# Patient Record
Sex: FEMALE | URBAN - METROPOLITAN AREA
[De-identification: names, ages, dates, MRNs, and addresses within clinical notes are randomized per-mention and may not be internally consistent; named-entity substitution may affect disease eponyms.]

---

## 2018-07-09 ENCOUNTER — HOSPITAL ENCOUNTER (OUTPATIENT)
Age: 23
Discharge: HOME | End: 2018-07-11
Payer: MEDICAID

## 2018-07-09 PROCEDURE — 87075 CULTR BACTERIA EXCEPT BLOOD: CPT

## 2018-07-09 PROCEDURE — 87070 CULTURE OTHR SPECIMN AEROBIC: CPT

## 2018-07-11 LAB — WOUND/ABSCESS: NORMAL

## 2018-07-14 LAB — ANAEROBIC CULTURE: NORMAL

## 2018-09-17 ENCOUNTER — HOSPITAL ENCOUNTER (OUTPATIENT)
Age: 23
Discharge: HOME | End: 2018-09-19
Payer: MEDICAID

## 2018-09-17 PROCEDURE — 87070 CULTURE OTHR SPECIMN AEROBIC: CPT

## 2018-09-17 PROCEDURE — 87075 CULTR BACTERIA EXCEPT BLOOD: CPT

## 2018-09-19 LAB
ANAEROBIC CULTURE: NORMAL
ORGANISM: ABNORMAL
WOUND/ABSCESS: ABNORMAL
WOUND/ABSCESS: ABNORMAL

## 2023-03-28 DIAGNOSIS — F32.A DEPRESSION, UNSPECIFIED: ICD-10-CM

## 2023-03-28 DIAGNOSIS — F41.9 ANXIETY DISORDER, UNSPECIFIED: ICD-10-CM

## 2023-03-28 PROBLEM — J45.909 ASTHMA (HHS-HCC): Status: ACTIVE | Noted: 2023-03-28

## 2023-03-28 RX ORDER — TRAZODONE HYDROCHLORIDE 50 MG/1
TABLET ORAL
Qty: 90 TABLET | Refills: 1 | Status: SHIPPED | OUTPATIENT
Start: 2023-03-28 | End: 2024-02-28

## 2023-03-28 RX ORDER — IBUPROFEN 800 MG/1
TABLET ORAL
COMMUNITY
Start: 2023-01-11 | End: 2023-08-01 | Stop reason: ALTCHOICE

## 2023-03-28 RX ORDER — TRAZODONE HYDROCHLORIDE 50 MG/1
50 TABLET ORAL NIGHTLY PRN
COMMUNITY

## 2023-03-28 RX ORDER — BUSPIRONE HYDROCHLORIDE 15 MG/1
TABLET ORAL
COMMUNITY
Start: 2022-05-17

## 2023-03-28 RX ORDER — HYDROXYZINE HYDROCHLORIDE 25 MG/1
TABLET, FILM COATED ORAL
COMMUNITY
End: 2023-08-01 | Stop reason: SDUPTHER

## 2023-03-28 RX ORDER — ESCITALOPRAM OXALATE 20 MG/1
20 TABLET ORAL DAILY
COMMUNITY
End: 2024-02-28

## 2023-06-05 DIAGNOSIS — J45.909 UNSPECIFIED ASTHMA, UNCOMPLICATED (HHS-HCC): ICD-10-CM

## 2023-06-05 RX ORDER — ALBUTEROL SULFATE 90 UG/1
2 AEROSOL, METERED RESPIRATORY (INHALATION) EVERY 6 HOURS PRN
COMMUNITY
Start: 2023-05-11

## 2023-06-05 RX ORDER — ALBUTEROL SULFATE 90 UG/1
AEROSOL, METERED RESPIRATORY (INHALATION)
Qty: 18 G | Refills: 2 | Status: SHIPPED | OUTPATIENT
Start: 2023-06-05

## 2023-07-07 ENCOUNTER — APPOINTMENT (OUTPATIENT)
Dept: PRIMARY CARE | Facility: CLINIC | Age: 28
End: 2023-07-07
Payer: COMMERCIAL

## 2023-07-31 NOTE — PROGRESS NOTES
Subjective   Patient ID: Candelaria Robins is a 28 y.o. female who presents for Annual Exam (Yearly visit; panic attacks; discuss medications; referral for pysic).    HPI     Joyce Reyes in [facility]. She was living in [place] and she moved here to [place]. .      Depression/Anxiety: She has not been taking her medications, she would like to take them regularly. Sometimes they were not helping like they should have. She is taking her trazodone since the last couple of days. She had stopped taking her meds for about 2 months.   She has anxiety attacks and states it might be making her asthma worse. She is on Escitalopram, hydroxyzine, trazodone and buspirone. Her panic attacks last a while. She gets nauseated, cramping, difficulty breathing. She will go outside. She takes ginger for her.   She sees Community counseling. She has a history of cutting. She is trying to see them weekly.   She had seen Tika with [facility], does not think she was seeing [facility].   She does see community counseling, thinks she might have missed them.   She would like to see psychiatrist. Her panic attacks are happening 2-3 times minimum to 3-4 max daily. She states that everything hurts. Some days they get better and go away.     Chest pains: She is also having chest pains. They are a little different than when she has a panic attack, it would hurt to breathe. She has not felt this recently, did not feel the same.     She is drinking about 1 energy drink per day, she has an occasional dunking doughnuts. She drinks maybe once per week. She does drink pop regular.     Asthma: She uses a Ventolin inhaler as needed.      All other systems reviewed and negative for complaint unless stated above.     PMH: Depression and anxiety, asthma   Surgery: Laser eye surgery as a child.   Family: Father had cancer,  . Smoking (lung).   smoker: none   Etoh: drinks on occasion   Drug use: none      PAP:   "13th with OBGYN, in [place]       Review of Systems   Constitutional:  Negative for chills and fever.   HENT:  Negative for congestion, ear pain and rhinorrhea.    Eyes:  Negative for discharge and redness.   Respiratory:  Negative for cough, shortness of breath and wheezing.    Cardiovascular:  Negative for chest pain and leg swelling.   Gastrointestinal:  Negative for abdominal pain, constipation, diarrhea, nausea and vomiting.   Genitourinary:  Negative for difficulty urinating, frequency and urgency.   Musculoskeletal:  Negative for gait problem.   Skin:  Negative for rash and wound.   Neurological:  Negative for dizziness, weakness and headaches.   Psychiatric/Behavioral:  Negative for confusion. The patient is nervous/anxious.        Objective   /73 (BP Location: Right arm, Patient Position: Sitting, BP Cuff Size: Adult)   Pulse 70   Ht 1.6 m (5' 3\")   Wt 58.5 kg (129 lb)   SpO2 97%   BMI 22.85 kg/m²     Physical Exam  Vitals reviewed.   Constitutional:       Appearance: Normal appearance.   HENT:      Head: Normocephalic.      Right Ear: Tympanic membrane, ear canal and external ear normal.      Left Ear: Tympanic membrane, ear canal and external ear normal.      Nose: No rhinorrhea.      Mouth/Throat:      Mouth: Mucous membranes are moist.      Pharynx: Oropharynx is clear.   Eyes:      Pupils: Pupils are equal, round, and reactive to light.   Cardiovascular:      Rate and Rhythm: Normal rate and regular rhythm.      Pulses: Normal pulses.   Pulmonary:      Effort: Pulmonary effort is normal.      Breath sounds: Normal breath sounds.   Abdominal:      General: Abdomen is flat. Bowel sounds are normal.      Palpations: Abdomen is soft.   Musculoskeletal:         General: No tenderness. Normal range of motion.      Right lower leg: No edema.      Left lower leg: No edema.   Lymphadenopathy:      Cervical: No cervical adenopathy.   Skin:     General: Skin is warm and dry.      Findings: No rash. "   Neurological:      Mental Status: She is alert and oriented to person, place, and time.   Psychiatric:         Mood and Affect: Mood normal.         Behavior: Behavior normal.       Assessment/Plan       #Asthma   Continue to use inhaler as needed      #Anxiety and depression  Following with community counseling  Encouraged to ask about psych at community counseling  continue escitalopram, hydroxyzine, trazadone and buspirone   may discuss possible changing medications d/t anxiety attacks   possible effexor   Encouraged to continue meds   Cut back on caffeine   Follow up in 2 months     #Chest pain   If still having would consider stress test EKG   Zio patch possible     Likely related to anxiety

## 2023-08-01 ENCOUNTER — OFFICE VISIT (OUTPATIENT)
Dept: PRIMARY CARE | Facility: CLINIC | Age: 28
End: 2023-08-01
Payer: COMMERCIAL

## 2023-08-01 VITALS
BODY MASS INDEX: 22.86 KG/M2 | HEART RATE: 70 BPM | SYSTOLIC BLOOD PRESSURE: 108 MMHG | OXYGEN SATURATION: 97 % | DIASTOLIC BLOOD PRESSURE: 73 MMHG | HEIGHT: 63 IN | WEIGHT: 129 LBS

## 2023-08-01 DIAGNOSIS — F32.A ANXIETY AND DEPRESSION: ICD-10-CM

## 2023-08-01 DIAGNOSIS — F41.9 ANXIETY: Primary | ICD-10-CM

## 2023-08-01 DIAGNOSIS — J45.20 MILD INTERMITTENT ASTHMA, UNSPECIFIED WHETHER COMPLICATED (HHS-HCC): ICD-10-CM

## 2023-08-01 DIAGNOSIS — F41.9 ANXIETY AND DEPRESSION: ICD-10-CM

## 2023-08-01 DIAGNOSIS — F41.0 PANIC ATTACKS: ICD-10-CM

## 2023-08-01 PROCEDURE — 99213 OFFICE O/P EST LOW 20 MIN: CPT | Performed by: REGISTERED NURSE

## 2023-08-01 PROCEDURE — 1036F TOBACCO NON-USER: CPT | Performed by: REGISTERED NURSE

## 2023-08-01 RX ORDER — HYDROXYZINE HYDROCHLORIDE 25 MG/1
TABLET, FILM COATED ORAL
Qty: 90 TABLET | Refills: 2 | Status: SHIPPED | OUTPATIENT
Start: 2023-08-01

## 2023-08-01 ASSESSMENT — ENCOUNTER SYMPTOMS
DIARRHEA: 0
EYE REDNESS: 0
NERVOUS/ANXIOUS: 1
WOUND: 0
FEVER: 0
ABDOMINAL PAIN: 0
SHORTNESS OF BREATH: 0
VOMITING: 0
RHINORRHEA: 0
CONSTIPATION: 0
WEAKNESS: 0
NAUSEA: 0
COUGH: 0
HEADACHES: 0
WHEEZING: 0
CHILLS: 0
EYE DISCHARGE: 0
CONFUSION: 0
DIFFICULTY URINATING: 0
FREQUENCY: 0
DIZZINESS: 0

## 2023-09-13 ENCOUNTER — TELEPHONE (OUTPATIENT)
Dept: PRIMARY CARE | Facility: CLINIC | Age: 28
End: 2023-09-13
Payer: COMMERCIAL

## 2023-09-13 NOTE — TELEPHONE ENCOUNTER
Patient called to say she's been to the ER a few times for different sick symptoms. She has ears flushed in ER but is still having a plugged feeling and decreased hearing, sinus congestion, cough, runny nose. She was told at ER to take allergy medicine and debrox. Her sister told her that she knows a lot of people with these symptoms right now and suggested she call us. She asked if she should take an allergy medicine. I explained how it could be helpful and that the sinus pressure and congestion could cause her ears to feel full. I suggested she try an OTC cough medicine with an expectorant and increase her water intake to help loosen up mucus. Advised her to call back if she is not feeling better or has gotten worse in the next 1-2 weeks. She does not believe she needs debrox because they were able to remove wax from her ears at ER.

## 2023-09-27 PROBLEM — H10.30 CONJUNCTIVITIS, ACUTE: Status: ACTIVE | Noted: 2023-09-27

## 2023-09-27 PROBLEM — H61.20 CERUMEN IMPACTION: Status: ACTIVE | Noted: 2023-09-27

## 2023-09-27 PROBLEM — J30.2 SEASONAL ALLERGIES: Status: ACTIVE | Noted: 2023-09-27

## 2023-09-27 RX ORDER — CIPROFLOXACIN HYDROCHLORIDE 3 MG/ML
SOLUTION/ DROPS OPHTHALMIC
COMMUNITY

## 2023-09-27 NOTE — PROGRESS NOTES
"Subjective   Patient ID: Candelaria Robins is a 28 y.o. female who presents for Follow-up (Panic/anxiety attacks; chest pains; was once told she could benefit from a service dog and would like to discuss this;  ).    ANDREINA Reyes in [facility] care. She was living in [place] and she moved here to [place]. April 9th. 2022.     She wants a service dog. She is still in counseling. She wants one for her anxiety/depression. She cannot have a \"regular\" dog at her property. She moved out of her sisters house. She is still having heavy chest, SOB at times. Happens every morning. Trying to stay consistent with her medications.     Depression/Anxiety: She has not been taking her medications, she would like to take them regularly. Sometimes they were not helping like they should have. She is taking her trazodone since the last couple of days. She had stopped taking her meds for about 2 months.   She has anxiety attacks and states it might be making her asthma worse. She is on Escitalopram, hydroxyzine, trazodone and buspirone. Her panic attacks last a while. She gets nauseated, cramping, difficulty breathing. She will go outside. She takes serene for her.   She sees Community counseling. She has a history of cutting. She is trying to see them weekly.   She had seen Tika with [facility], does not think she was seeing [facility].   She does see community counseling, thinks she might have missed them.   She would like to see psychiatrist. Her panic attacks are happening 2-3 times minimum to 3-4 max daily. She states that everything hurts. Some days they get better and go away.      Chest pains: She still has multiple panic attacks at times. She is having heaviness 7/10 and hard to breath.     She is also having chest pains. They are a little different than when she has a panic attack, it would hurt to breathe. She has not felt this recently, did not feel the same.      She is drinking about 1 energy drink " "per day, she has an occasional dunking doughnuts. She drinks maybe once per week. She does drink pop regular.      Asthma: She uses a Ventolin inhaler as needed.      All other systems reviewed and negative for complaint unless stated above.     PMH: Depression and anxiety, asthma   Surgery: Laser eye surgery as a child.   Family: Father had cancer,  . Smoking (lung).   smoker: none   Etoh: drinks on occasion   Drug use: none      PAP:  with OBGYN, in [place]       Review of Systems   Constitutional:  Negative for chills and fever.   HENT:  Negative for congestion, ear pain and rhinorrhea.    Eyes:  Negative for discharge and redness.   Respiratory:  Negative for cough, shortness of breath and wheezing.    Cardiovascular:  Negative for chest pain and leg swelling.   Gastrointestinal:  Negative for abdominal pain, constipation, diarrhea, nausea and vomiting.   Genitourinary:  Negative for difficulty urinating, frequency and urgency.   Musculoskeletal:  Negative for gait problem.   Skin:  Negative for rash and wound.   Neurological:  Negative for dizziness, weakness and headaches.   Psychiatric/Behavioral:  Negative for confusion. The patient is nervous/anxious.        Objective   /88 (BP Location: Right arm, Patient Position: Sitting, BP Cuff Size: Adult)   Pulse 76   Ht 1.6 m (5' 3\")   Wt 57.4 kg (126 lb 9.6 oz)   SpO2 100%   BMI 22.43 kg/m²     Physical Exam  Vitals reviewed.   Constitutional:       Appearance: Normal appearance.   HENT:      Head: Normocephalic.      Right Ear: Tympanic membrane, ear canal and external ear normal.      Left Ear: Tympanic membrane, ear canal and external ear normal.      Nose: No rhinorrhea.      Mouth/Throat:      Mouth: Mucous membranes are moist.      Pharynx: Oropharynx is clear.   Eyes:      Pupils: Pupils are equal, round, and reactive to light.   Cardiovascular:      Rate and Rhythm: Normal rate and regular rhythm.      Pulses: Normal pulses. "   Pulmonary:      Effort: Pulmonary effort is normal.      Breath sounds: Normal breath sounds.   Abdominal:      General: Abdomen is flat. Bowel sounds are normal.      Palpations: Abdomen is soft.   Musculoskeletal:         General: No tenderness. Normal range of motion.      Right lower leg: No edema.      Left lower leg: No edema.   Lymphadenopathy:      Cervical: No cervical adenopathy.   Skin:     General: Skin is warm and dry.      Findings: No rash.   Neurological:      Mental Status: She is alert and oriented to person, place, and time.   Psychiatric:         Mood and Affect: Mood normal.         Behavior: Behavior normal.         Assessment/Plan         #Asthma   Continue to use inhaler as needed      #Anxiety and depression  Following with community counseling  Has an appointment with psych 10/24  Letter provided stating she has anxiety and would benefit from a  dog   continue escitalopram, hydroxyzine, trazadone and buspirone   may discuss possible changing medications d/t anxiety attacks   possible effexor   Encouraged to continue meds   Cut back on caffeine        #Chest pain   If still having would consider stress test EKG   Zio patch ordered      Likely related to anxiety

## 2023-10-02 ENCOUNTER — OFFICE VISIT (OUTPATIENT)
Dept: PRIMARY CARE | Facility: CLINIC | Age: 28
End: 2023-10-02
Payer: COMMERCIAL

## 2023-10-02 VITALS
SYSTOLIC BLOOD PRESSURE: 138 MMHG | DIASTOLIC BLOOD PRESSURE: 88 MMHG | HEIGHT: 63 IN | OXYGEN SATURATION: 100 % | HEART RATE: 76 BPM | BODY MASS INDEX: 22.43 KG/M2 | WEIGHT: 126.6 LBS

## 2023-10-02 DIAGNOSIS — R07.9 CHEST PAIN, UNSPECIFIED TYPE: Primary | ICD-10-CM

## 2023-10-02 PROCEDURE — 1036F TOBACCO NON-USER: CPT | Performed by: REGISTERED NURSE

## 2023-10-02 PROCEDURE — 99214 OFFICE O/P EST MOD 30 MIN: CPT | Performed by: REGISTERED NURSE

## 2023-10-02 NOTE — PATIENT INSTRUCTIONS
Radiology:  - Radiology: [phone]  -ECHO, [place]: [phone]    [facility] [phone]

## 2023-10-02 NOTE — LETTER
To Whom It May Concern,       Candelaria Robins is a patient under my care. She is currently being treated for Anxiety and depression. It is my medical opinion that she would benefit from the use of an emotional support dog which she is looking into. She is planning to register this pet as an emotional support  and she is willing to provide proof if needed in order to keep her emotional support  in her home/apartment.   If you have any questions or concerns, please feel free to call me at my office [phone].         Thank you,             MAR Zavala

## 2023-10-04 ENCOUNTER — TELEPHONE (OUTPATIENT)
Dept: PRIMARY CARE | Facility: CLINIC | Age: 28
End: 2023-10-04
Payer: COMMERCIAL

## 2023-10-04 ENCOUNTER — CLINICAL SUPPORT (OUTPATIENT)
Dept: PRIMARY CARE | Facility: CLINIC | Age: 28
End: 2023-10-04
Payer: COMMERCIAL

## 2023-10-04 DIAGNOSIS — J02.9 SORE THROAT: ICD-10-CM

## 2023-10-04 LAB — POC RAPID STREP: NEGATIVE

## 2023-10-04 PROCEDURE — 87880 STREP A ASSAY W/OPTIC: CPT | Performed by: REGISTERED NURSE

## 2023-10-04 NOTE — TELEPHONE ENCOUNTER
Candelaria said her sister looked at her throat and thinks she has strep. She wants to know what she can do to treat it.

## 2023-10-05 ENCOUNTER — HOSPITAL ENCOUNTER (EMERGENCY)
Facility: HOSPITAL | Age: 28
Discharge: HOME | End: 2023-10-05
Attending: EMERGENCY MEDICINE | Admitting: EMERGENCY MEDICINE
Payer: COMMERCIAL

## 2023-10-05 VITALS
HEIGHT: 63 IN | RESPIRATION RATE: 18 BRPM | TEMPERATURE: 98.4 F | HEART RATE: 76 BPM | BODY MASS INDEX: 22.32 KG/M2 | OXYGEN SATURATION: 99 % | DIASTOLIC BLOOD PRESSURE: 71 MMHG | WEIGHT: 126 LBS | SYSTOLIC BLOOD PRESSURE: 108 MMHG

## 2023-10-05 DIAGNOSIS — B08.4 HAND, FOOT AND MOUTH DISEASE: Primary | ICD-10-CM

## 2023-10-05 PROCEDURE — 99281 EMR DPT VST MAYX REQ PHY/QHP: CPT | Performed by: EMERGENCY MEDICINE

## 2023-10-05 PROCEDURE — 99282 EMERGENCY DEPT VISIT SF MDM: CPT

## 2023-10-05 ASSESSMENT — PAIN DESCRIPTION - PAIN TYPE: TYPE: ACUTE PAIN

## 2023-10-05 ASSESSMENT — COLUMBIA-SUICIDE SEVERITY RATING SCALE - C-SSRS
1. IN THE PAST MONTH, HAVE YOU WISHED YOU WERE DEAD OR WISHED YOU COULD GO TO SLEEP AND NOT WAKE UP?: NO
6. HAVE YOU EVER DONE ANYTHING, STARTED TO DO ANYTHING, OR PREPARED TO DO ANYTHING TO END YOUR LIFE?: NO
2. HAVE YOU ACTUALLY HAD ANY THOUGHTS OF KILLING YOURSELF?: NO

## 2023-10-05 ASSESSMENT — PAIN SCALES - GENERAL: PAINLEVEL_OUTOF10: 2

## 2023-10-05 ASSESSMENT — PAIN - FUNCTIONAL ASSESSMENT: PAIN_FUNCTIONAL_ASSESSMENT: 0-10

## 2023-10-05 NOTE — ED PROVIDER NOTES
Department of Emergency Medicine   ED  Provider Note  Admit Date/RoomTime: 10/5/2023  5:40 PM  ED Room: [facility]/[address]              History of Present Illness:   Candelaria Robins is a 28 y.o. female presenting to the ED for rash on her hands and feet, beginning 2 days after caring for her nieces and nephews who had an foot mouth disease.  The complaint has been persistent, moderate in severity, and worsened by nothing.  Patient denies any cough shortness of breath headache stiff neck fever chills or other signs systemic illness.  She has a mildly uncomfortable rash to her hands feet and on the roof of her mouth.      Review of Systems:   Pertinent positives and review of systems as noted above.  Remaining 10 review of systems is negative or noncontributory to today's episode of care.        --------------------------------------------- PAST HISTORY ---------------------------------------------  Past Medical History:  has no past medical history on file.    Past Surgical History:  has no past surgical history on file.    Social History:  reports that she has never smoked. She has never been exposed to tobacco smoke. She has never used smokeless tobacco. She reports that she does not currently use alcohol. She reports that she does not use drugs.    Family History: family history is not on file. Unless otherwise noted, family history is non contributory    The patient’s home medications have been reviewed.    Allergies: Patient has no known allergies.    -------------------------------------------------- RESULTS -------------------------------------------------  All laboratory and radiology results have been personally reviewed by myself   LABS:  Labs Reviewed - No data to display      RADIOLOGY:  Interpreted by Radiologist.  No orders to display       No results found for this or any previous visit (from the past 4464 hour(s)).  ------------------------- NURSING NOTES AND VITALS REVIEWED  "---------------------------   The nursing notes within the ED encounter and vital signs as below have been reviewed.   /71   Pulse 76   Temp 36.9 °C (98.4 °F) (Temporal)   Resp 18   Ht 1.6 m (5' 3\")   Wt 57.2 kg (126 lb)   SpO2 99%   BMI 22.32 kg/m²   Oxygen Saturation Interpretation: Normal      ---------------------------------------------------PHYSICAL EXAM--------------------------------------    Constitutional/General: Alert and oriented x3, well appearing, non toxic in NAD  Head: Normocephalic and atraumatic  Eyes: PERRL, EOMI, conjunctiva normal, sclera non icteric  Mouth: Oropharynx clear, handling secretions, no trismus, no asymmetry of the posterior oropharynx or uvular edema  Neck: Supple, full ROM, non tender to palpation in the midline, no stridor, no crepitus, no meningeal signs  Respiratory: Lungs clear to auscultation bilaterally, no wheezes, rales, or rhonchi. Not in respiratory distress  Cardiovascular:  Regular rate. Regular rhythm. No murmurs, gallops, or rubs. 2+ distal pulses  Chest: No chest wall tenderness  GI:  Abdomen Soft, Non tender, Non distended.  +BS. No organomegaly, no palpable masses,  No rebound, guarding, or rigidity.   Musculoskeletal: Moves all extremities x 4. Warm and well perfused, no clubbing, cyanosis, or edema. Capillary refill <3 seconds  Integument: skin warm and dry.  Has a classic coxsackievirus rash to the forefoot mouth hands and feet.  She has known exposure.  Lymphatic: no lymphadenopathy noted  Neurologic: GCS 15, no focal deficits, symmetric strength 5/5 in the upper and lower extremities bilaterally  Psychiatric: Normal Affect    Procedures    ------------------------------ ED COURSE/MEDICAL DECISION MAKING----------------------    Medical Decision Making:   Patient is stable for outpatient management.  She is advised to take Tylenol Advil for aches and pains rest encourage fluids and to quarantine at home for 3 to 5 days for the rash starts to " improve.  She is return for worsening symptoms or concerns.    Diagnoses as of 10/05/23 1814   Hand, foot and mouth disease      Counseling:   The emergency provider has spoken with the patient and discussed today’s results, in addition to providing specific details for the plan of care and counseling regarding the diagnosis and prognosis.  Questions are answered at this time and they are agreeable with the plan.      --------------------------------- IMPRESSION AND DISPOSITION ---------------------------------        IMPRESSION  1. Hand, foot and mouth disease        DISPOSITION  Disposition: Discharge to home  Patient condition is good      Billing Provider Critical Care Time: 0 minutes     Dutch Alejandra MD  10/05/23 1815

## 2023-10-05 NOTE — DISCHARGE INSTRUCTIONS
Tylenol or Advil for aches and pains.    Rest encourage fluids.    Quarantine at home is much as possible for the next 3 days.    The rash expect the last 5 to 7 days.    Return for worsening symptoms or concerns

## 2023-10-05 NOTE — ED TRIAGE NOTES
Patient ambulatory to ED, A&Ox3 for rash to hands & feet that is painful. No pruritus. Exposure to hand foot and mouth from niece/nephews.

## 2023-10-08 ASSESSMENT — ENCOUNTER SYMPTOMS
CONFUSION: 0
ABDOMINAL PAIN: 0
VOMITING: 0
DIARRHEA: 0
CONSTIPATION: 0
FEVER: 0
DIZZINESS: 0
WOUND: 0
EYE REDNESS: 0
FREQUENCY: 0
COUGH: 0
WEAKNESS: 0
RHINORRHEA: 0
NERVOUS/ANXIOUS: 1
HEADACHES: 0
WHEEZING: 0
SHORTNESS OF BREATH: 0
EYE DISCHARGE: 0
NAUSEA: 0
CHILLS: 0
DIFFICULTY URINATING: 0

## 2023-12-30 ENCOUNTER — HOSPITAL ENCOUNTER (EMERGENCY)
Facility: HOSPITAL | Age: 28
Discharge: HOME | End: 2023-12-30
Attending: EMERGENCY MEDICINE
Payer: COMMERCIAL

## 2023-12-30 ENCOUNTER — APPOINTMENT (OUTPATIENT)
Dept: RADIOLOGY | Facility: HOSPITAL | Age: 28
End: 2023-12-30
Payer: COMMERCIAL

## 2023-12-30 VITALS
HEART RATE: 93 BPM | RESPIRATION RATE: 18 BRPM | TEMPERATURE: 98.2 F | DIASTOLIC BLOOD PRESSURE: 82 MMHG | OXYGEN SATURATION: 96 % | WEIGHT: 125 LBS | HEIGHT: 63 IN | SYSTOLIC BLOOD PRESSURE: 121 MMHG | BODY MASS INDEX: 22.15 KG/M2

## 2023-12-30 DIAGNOSIS — M79.641 PAIN OF RIGHT HAND: Primary | ICD-10-CM

## 2023-12-30 PROCEDURE — 99283 EMERGENCY DEPT VISIT LOW MDM: CPT | Mod: 25 | Performed by: EMERGENCY MEDICINE

## 2023-12-30 PROCEDURE — 73130 X-RAY EXAM OF HAND: CPT | Mod: RT

## 2023-12-30 PROCEDURE — 73130 X-RAY EXAM OF HAND: CPT | Mod: RIGHT SIDE | Performed by: RADIOLOGY

## 2023-12-30 ASSESSMENT — COLUMBIA-SUICIDE SEVERITY RATING SCALE - C-SSRS
6. HAVE YOU EVER DONE ANYTHING, STARTED TO DO ANYTHING, OR PREPARED TO DO ANYTHING TO END YOUR LIFE?: NO
1. IN THE PAST MONTH, HAVE YOU WISHED YOU WERE DEAD OR WISHED YOU COULD GO TO SLEEP AND NOT WAKE UP?: NO
2. HAVE YOU ACTUALLY HAD ANY THOUGHTS OF KILLING YOURSELF?: NO

## 2023-12-30 ASSESSMENT — PAIN - FUNCTIONAL ASSESSMENT: PAIN_FUNCTIONAL_ASSESSMENT: 0-10

## 2023-12-30 ASSESSMENT — PAIN DESCRIPTION - ORIENTATION: ORIENTATION: RIGHT

## 2023-12-30 ASSESSMENT — PAIN DESCRIPTION - LOCATION: LOCATION: HAND

## 2023-12-30 ASSESSMENT — PAIN DESCRIPTION - PAIN TYPE: TYPE: ACUTE PAIN

## 2023-12-30 ASSESSMENT — PAIN SCALES - GENERAL: PAINLEVEL_OUTOF10: 7

## 2023-12-30 NOTE — ED PROVIDER NOTES
HPI   Chief Complaint   Patient presents with   • Cyst     Right hand       Patient presents to the emergency department with a chief complaint of slight swelling and pain to her right third knuckle area.  Patient denies any trauma.  Patient is in absolutely no acute distress on arrival.    ROS:    CONSTITUTIONAL: Denies weight loss, fever and chills    HEENT: Denies changes in vision and hearing, No sore throat    RESPIRATORY: Denies SOB and cough    CV: Denies palpitations or chest pain    GI: Denies abdominal pain, nausea, vomiting and diarrhea    : Denies dysuria and urinary frequency    MUSCULOSKELETAL: Right third knuckle pain and swelling    SKIN: Denies rash and pruritus    NEUROLOGICAL: Denies headache and syncope    PSYCHIATRIC: Denies recent changes in mood. Denies anxiety and depression      PHYSICAL EXAM:    GENERAL: Alert and oriented x 3. No acute distress. Well-nourished    EYES: EOMI. Anicteric    HEENT: Moist mucous membranes. No scleral icterus. No cervical lymphadenopathy    LUNGS: Clear to auscultation bilaterally. No accessory muscle use    CARDIOVASCULAR: Regular rate and rhythm. No murmur. No JVD    ABDOMEN: Soft, non-tender and non-distended. No palpable masses    EXTREMITIES: Dorsum of right hand with slight third knuckle swelling with slight tenderness palpation, no ecchymosis noted    SKIN: No rashes or lesions    NEUROLOGIC: No focal neurological deficits. CN II-XII grossly intact    PSYCHIATRIC: Cooperative. Appropriate mood and affect      Medical Decision Making:    Differential Diagnosis: Contusion, fracture, cyst    Clinical Laboratory Review:    Imaging Review: Reviewed imaging which was completely within normal limits the guys did    Discussion with Consulting Physician:    Treatment Plan: Discussed negative x-ray with patient at this time    Follow Up:  Follow up with your primary care physician as soon as possible    Return Precautions:  Return to the emergency department if  symptoms worsen at any time                          No data recorded                Patient History   History reviewed. No pertinent past medical history.  History reviewed. No pertinent surgical history.  No family history on file.  Social History     Tobacco Use   • Smoking status: Never     Passive exposure: Never   • Smokeless tobacco: Never   Substance Use Topics   • Alcohol use: Not Currently   • Drug use: Never       Physical Exam   ED Triage Vitals [12/30/23 1146]   Temp Heart Rate Resp BP   36.8 °C (98.2 °F) 93 18 121/82      SpO2 Temp Source Heart Rate Source Patient Position   96 % Temporal Monitor --      BP Location FiO2 (%)     -- --       Physical Exam    ED Course & MDM   Diagnoses as of 12/30/23 1259   Pain of right hand       Medical Decision Making      Procedure  Procedures    Diagnoses as of 12/30/23 1259   Pain of right hand          Isa Sullivan DO  12/30/23 1259

## 2023-12-30 NOTE — ED TRIAGE NOTES
Pt to ED c/o possible cyst in right hand. Pt states she has had cyst in the same hand before and feels she may have another. Pt has swollen area around 2nd and 3rd finger/knuckle. Pt states it is painful, 7/10. States she noticed it yesterday.

## 2024-01-02 ENCOUNTER — APPOINTMENT (OUTPATIENT)
Dept: PRIMARY CARE | Facility: CLINIC | Age: 29
End: 2024-01-02
Payer: COMMERCIAL

## 2024-01-08 ENCOUNTER — HOSPITAL ENCOUNTER (EMERGENCY)
Facility: HOSPITAL | Age: 29
Discharge: HOME | End: 2024-01-08
Attending: EMERGENCY MEDICINE
Payer: COMMERCIAL

## 2024-01-08 VITALS
OXYGEN SATURATION: 99 % | DIASTOLIC BLOOD PRESSURE: 70 MMHG | TEMPERATURE: 100.9 F | WEIGHT: 125 LBS | HEART RATE: 72 BPM | SYSTOLIC BLOOD PRESSURE: 116 MMHG | RESPIRATION RATE: 20 BRPM | BODY MASS INDEX: 22.15 KG/M2 | HEIGHT: 63 IN

## 2024-01-08 DIAGNOSIS — J45.909 MILD ASTHMA WITHOUT COMPLICATION, UNSPECIFIED WHETHER PERSISTENT (HHS-HCC): ICD-10-CM

## 2024-01-08 DIAGNOSIS — J01.90 ACUTE SINUSITIS, RECURRENCE NOT SPECIFIED, UNSPECIFIED LOCATION: Primary | ICD-10-CM

## 2024-01-08 LAB
FLUAV RNA RESP QL NAA+PROBE: NOT DETECTED
FLUBV RNA RESP QL NAA+PROBE: NOT DETECTED
RSV RNA RESP QL NAA+PROBE: NOT DETECTED
S PYO DNA THROAT QL NAA+PROBE: NOT DETECTED
SARS-COV-2 RNA RESP QL NAA+PROBE: NOT DETECTED

## 2024-01-08 PROCEDURE — 2500000004 HC RX 250 GENERAL PHARMACY W/ HCPCS (ALT 636 FOR OP/ED): Mod: SE | Performed by: EMERGENCY MEDICINE

## 2024-01-08 PROCEDURE — 2500000005 HC RX 250 GENERAL PHARMACY W/O HCPCS: Mod: SE

## 2024-01-08 PROCEDURE — 99283 EMERGENCY DEPT VISIT LOW MDM: CPT | Performed by: EMERGENCY MEDICINE

## 2024-01-08 PROCEDURE — 87637 SARSCOV2&INF A&B&RSV AMP PRB: CPT | Performed by: EMERGENCY MEDICINE

## 2024-01-08 PROCEDURE — 2500000001 HC RX 250 WO HCPCS SELF ADMINISTERED DRUGS (ALT 637 FOR MEDICARE OP): Mod: SE

## 2024-01-08 PROCEDURE — 87651 STREP A DNA AMP PROBE: CPT | Performed by: EMERGENCY MEDICINE

## 2024-01-08 RX ORDER — ALBUTEROL SULFATE 90 UG/1
1-2 AEROSOL, METERED RESPIRATORY (INHALATION) EVERY 6 HOURS PRN
Qty: 18 G | Refills: 0 | Status: SHIPPED | OUTPATIENT
Start: 2024-01-08 | End: 2024-06-07 | Stop reason: SDUPTHER

## 2024-01-08 RX ORDER — ONDANSETRON HYDROCHLORIDE 2 MG/ML
4 INJECTION, SOLUTION INTRAVENOUS ONCE
Status: DISCONTINUED | OUTPATIENT
Start: 2024-01-08 | End: 2024-01-08

## 2024-01-08 RX ORDER — AMOXICILLIN AND CLAVULANATE POTASSIUM 875; 125 MG/1; MG/1
TABLET, FILM COATED ORAL
Status: COMPLETED
Start: 2024-01-08 | End: 2024-01-08

## 2024-01-08 RX ORDER — PREDNISONE 10 MG/1
50 TABLET ORAL DAILY
Qty: 25 TABLET | Refills: 0 | Status: SHIPPED | OUTPATIENT
Start: 2024-01-08 | End: 2024-01-13

## 2024-01-08 RX ORDER — AMOXICILLIN AND CLAVULANATE POTASSIUM 875; 125 MG/1; MG/1
1 TABLET, FILM COATED ORAL ONCE
Status: COMPLETED | OUTPATIENT
Start: 2024-01-08 | End: 2024-01-08

## 2024-01-08 RX ORDER — ONDANSETRON 4 MG/1
TABLET, ORALLY DISINTEGRATING ORAL
Status: COMPLETED
Start: 2024-01-08 | End: 2024-01-08

## 2024-01-08 RX ORDER — PREDNISONE 20 MG/1
40 TABLET ORAL ONCE
Status: COMPLETED | OUTPATIENT
Start: 2024-01-08 | End: 2024-01-08

## 2024-01-08 RX ORDER — BENZONATATE 100 MG/1
100 CAPSULE ORAL 3 TIMES DAILY PRN
Qty: 20 CAPSULE | Refills: 0 | Status: SHIPPED | OUTPATIENT
Start: 2024-01-08 | End: 2024-01-15

## 2024-01-08 RX ORDER — ONDANSETRON 4 MG/1
4 TABLET, ORALLY DISINTEGRATING ORAL EVERY 8 HOURS PRN
Qty: 20 TABLET | Refills: 0 | Status: SHIPPED | OUTPATIENT
Start: 2024-01-08 | End: 2024-01-15

## 2024-01-08 RX ORDER — AMOXICILLIN AND CLAVULANATE POTASSIUM 875; 125 MG/1; MG/1
1 TABLET, FILM COATED ORAL EVERY 12 HOURS
Qty: 20 TABLET | Refills: 0 | Status: SHIPPED | OUTPATIENT
Start: 2024-01-08 | End: 2024-01-18

## 2024-01-08 RX ORDER — ONDANSETRON 4 MG/1
4 TABLET, ORALLY DISINTEGRATING ORAL ONCE
Status: COMPLETED | OUTPATIENT
Start: 2024-01-08 | End: 2024-01-08

## 2024-01-08 RX ADMIN — ONDANSETRON 4 MG: 4 TABLET, ORALLY DISINTEGRATING ORAL at 23:08

## 2024-01-08 RX ADMIN — AMOXICILLIN AND CLAVULANATE POTASSIUM 875 MG: 875; 125 TABLET, FILM COATED ORAL at 23:10

## 2024-01-08 RX ADMIN — PREDNISONE 40 MG: 20 TABLET ORAL at 23:10

## 2024-01-08 ASSESSMENT — PAIN - FUNCTIONAL ASSESSMENT: PAIN_FUNCTIONAL_ASSESSMENT: 0-10

## 2024-01-08 ASSESSMENT — PAIN DESCRIPTION - PAIN TYPE: TYPE: ACUTE PAIN

## 2024-01-08 ASSESSMENT — PAIN SCALES - GENERAL: PAINLEVEL_OUTOF10: 5 - MODERATE PAIN

## 2024-01-09 NOTE — ED PROVIDER NOTES
HPI   Chief Complaint   Patient presents with    Flu Symptoms       28-year-old female presents emergency department complaining of cough and congestion for the past few days.  She had an episode of emesis.  Denies any diarrhea.  No chest pain.  She has a history of asthma and has been wheezing some.  No fevers or chills.  Denies any other complaints.  Has not taken anything to help with the symptoms.      History provided by:  Patient                      No data recorded                Patient History   History reviewed. No pertinent past medical history.  History reviewed. No pertinent surgical history.  No family history on file.  Social History     Tobacco Use    Smoking status: Never     Passive exposure: Never    Smokeless tobacco: Never   Substance Use Topics    Alcohol use: Not Currently    Drug use: Never       Physical Exam   ED Triage Vitals [01/08/24 2135]   Temp Heart Rate Resp BP   38.3 °C (100.9 °F) (!) 120 18 112/81      SpO2 Temp Source Heart Rate Source Patient Position   98 % Skin -- Sitting      BP Location FiO2 (%)     Left arm --       Physical Exam  HENT:      Head: Normocephalic and atraumatic.      Mouth/Throat:      Mouth: Mucous membranes are moist.      Pharynx: Posterior oropharyngeal erythema present.      Comments: Yellowish postnasal drip  Eyes:      Extraocular Movements: Extraocular movements intact.      Conjunctiva/sclera: Conjunctivae normal.   Cardiovascular:      Rate and Rhythm: Normal rate and regular rhythm.   Pulmonary:      Effort: Pulmonary effort is normal.      Breath sounds: Wheezing present.   Abdominal:      Palpations: Abdomen is soft.      Tenderness: There is no abdominal tenderness.   Musculoskeletal:         General: Normal range of motion.   Skin:     General: Skin is warm and dry.   Neurological:      General: No focal deficit present.      Mental Status: She is alert and oriented to person, place, and time.   Psychiatric:         Behavior: Behavior normal.          ED Course & MDM   Diagnoses as of 01/08/24 3884   Acute sinusitis, recurrence not specified, unspecified location   Mild asthma without complication, unspecified whether persistent   ED course: Patient with upper respiratory tract infection, sinusitis.  Negative COVID, flu and rapid strep.  Started on Augmentin for acute sinusitis.  Dose of prednisone.  Patient will be discharged to continue taking medications, she has mild bronchospasm and the prednisone should help with that.  She is given a prescription for an albuterol inhaler in addition to antibiotics and steroids.  Tessalon Perles for the cough.  Patient discharged in improved condition.    Medical Decision Making  Patient presents with cough and congestion and sore throat.  Differential includes sinusitis, COVID, flu, RSV, pneumonia, bronchitis.  Patient has significant congestion and facial pressure is a history of sinusitis.  She has postnasal drip.  She will be covered with antibiotics.  Clinically not pneumonia.  She will be placed on steroids, cough medication and will be discharged with a prescription for antiemetics.  Patient is not dehydrated that will require IV hydration, she has been drinking in the emergency department.  Patient discharged in improved condition.    Amount and/or Complexity of Data Reviewed  Labs:  Decision-making details documented in ED Course.        Procedure  Procedures     Delmer Dawn MD  01/08/24 1084

## 2024-02-28 DIAGNOSIS — F32.A DEPRESSION, UNSPECIFIED: ICD-10-CM

## 2024-02-28 DIAGNOSIS — F41.9 ANXIETY DISORDER, UNSPECIFIED: ICD-10-CM

## 2024-02-28 RX ORDER — ESCITALOPRAM OXALATE 20 MG/1
20 TABLET ORAL DAILY
Qty: 90 TABLET | Refills: 1 | Status: SHIPPED | OUTPATIENT
Start: 2024-02-28

## 2024-02-28 RX ORDER — TRAZODONE HYDROCHLORIDE 50 MG/1
50 TABLET ORAL NIGHTLY PRN
Qty: 90 TABLET | Refills: 1 | Status: SHIPPED | OUTPATIENT
Start: 2024-02-28

## 2024-06-07 DIAGNOSIS — J45.909 MILD ASTHMA WITHOUT COMPLICATION, UNSPECIFIED WHETHER PERSISTENT (HHS-HCC): ICD-10-CM

## 2024-06-07 RX ORDER — ALBUTEROL SULFATE 90 UG/1
1-2 AEROSOL, METERED RESPIRATORY (INHALATION) EVERY 6 HOURS PRN
Qty: 18 G | Refills: 0 | Status: SHIPPED | OUTPATIENT
Start: 2024-06-07 | End: 2024-07-07

## 2024-07-07 DIAGNOSIS — J45.909 MILD ASTHMA WITHOUT COMPLICATION, UNSPECIFIED WHETHER PERSISTENT (HHS-HCC): ICD-10-CM

## 2024-07-08 RX ORDER — ALBUTEROL SULFATE 90 UG/1
1-2 AEROSOL, METERED RESPIRATORY (INHALATION) EVERY 6 HOURS PRN
Qty: 18 G | Refills: 1 | Status: SHIPPED | OUTPATIENT
Start: 2024-07-08 | End: 2024-08-07

## 2024-08-29 ENCOUNTER — HOSPITAL ENCOUNTER (EMERGENCY)
Facility: HOSPITAL | Age: 29
Discharge: HOME | End: 2024-08-29
Attending: EMERGENCY MEDICINE
Payer: COMMERCIAL

## 2024-08-29 VITALS
OXYGEN SATURATION: 100 % | TEMPERATURE: 97.6 F | HEIGHT: 63 IN | RESPIRATION RATE: 15 BRPM | BODY MASS INDEX: 20.55 KG/M2 | HEART RATE: 98 BPM | WEIGHT: 116 LBS | DIASTOLIC BLOOD PRESSURE: 71 MMHG | SYSTOLIC BLOOD PRESSURE: 130 MMHG

## 2024-08-29 DIAGNOSIS — U07.1 COVID-19: Primary | ICD-10-CM

## 2024-08-29 LAB
FLUAV RNA RESP QL NAA+PROBE: NOT DETECTED
FLUBV RNA RESP QL NAA+PROBE: NOT DETECTED
SARS-COV-2 RNA RESP QL NAA+PROBE: DETECTED

## 2024-08-29 PROCEDURE — 2500000001 HC RX 250 WO HCPCS SELF ADMINISTERED DRUGS (ALT 637 FOR MEDICARE OP): Mod: SE | Performed by: EMERGENCY MEDICINE

## 2024-08-29 PROCEDURE — 99283 EMERGENCY DEPT VISIT LOW MDM: CPT

## 2024-08-29 PROCEDURE — 87502 INFLUENZA DNA AMP PROBE: CPT | Performed by: EMERGENCY MEDICINE

## 2024-08-29 RX ORDER — IBUPROFEN 600 MG/1
600 TABLET ORAL EVERY 8 HOURS PRN
Qty: 30 TABLET | Refills: 0 | Status: SHIPPED | OUTPATIENT
Start: 2024-08-29

## 2024-08-29 RX ORDER — IBUPROFEN 600 MG/1
600 TABLET ORAL ONCE
Status: COMPLETED | OUTPATIENT
Start: 2024-08-29 | End: 2024-08-29

## 2024-08-29 RX ADMIN — IBUPROFEN 600 MG: 600 TABLET ORAL at 13:05

## 2024-08-29 ASSESSMENT — ENCOUNTER SYMPTOMS
COLOR CHANGE: 0
EYE PAIN: 0
RHINORRHEA: 1
FEVER: 1
SHORTNESS OF BREATH: 0
ABDOMINAL PAIN: 0
COUGH: 1
ARTHRALGIAS: 1
CHILLS: 1
MYALGIAS: 1
DYSURIA: 0
VOMITING: 0
PALPITATIONS: 0
SEIZURES: 0
NAUSEA: 1
BACK PAIN: 0
HEMATURIA: 0
SORE THROAT: 0

## 2024-08-29 ASSESSMENT — PAIN - FUNCTIONAL ASSESSMENT: PAIN_FUNCTIONAL_ASSESSMENT: 0-10

## 2024-08-29 ASSESSMENT — PAIN DESCRIPTION - LOCATION
LOCATION: GENERALIZED
LOCATION: GENERALIZED

## 2024-08-29 ASSESSMENT — PAIN SCALES - GENERAL
PAINLEVEL_OUTOF10: 9

## 2024-08-29 ASSESSMENT — PAIN DESCRIPTION - PROGRESSION: CLINICAL_PROGRESSION: NOT CHANGED

## 2024-08-29 ASSESSMENT — PAIN DESCRIPTION - DESCRIPTORS: DESCRIPTORS: ACHING

## 2024-08-29 NOTE — DISCHARGE INSTRUCTIONS
Push clear fluids. REST.  You may take tylenol and/or ibuprofen as needed for pain/fever.  See Covid information sheet.  Self quarantine for 5 days from the Start of your symptoms

## 2024-08-29 NOTE — ED PROVIDER NOTES
Department of Emergency Medicine   ED  Provider Note  Admit Date/RoomTime: 8/29/2024 12:24 PM  ED Room: 10/10                  History of Present Illness:   Candelaria Robins is a 29 y.o. female presenting to the ED for cold/flu symptoms, beginning 2 days ago.  The complaint has been persistent, moderate in severity, and worsened by nothing.  Patient complains of whole body aches joint aches.  She has had a little bit of a mild headache.  Runny nose.  Nausea and vomiting x 1.  No chest pain or shortness of breath.  Positive exposure to family member who was recently diagnosed with COVID.  She states she took 2 COVID test yesterday at home, and they were negative.      Review of Systems:   Pertinent positives and review of systems as noted above.  Remaining 10 review of systems is negative or noncontributory to today's episode of care.  Review of Systems   Constitutional:  Positive for chills and fever.   HENT:  Positive for rhinorrhea. Negative for ear pain and sore throat.    Eyes:  Negative for pain and visual disturbance.   Respiratory:  Positive for cough. Negative for shortness of breath.    Cardiovascular:  Negative for chest pain and palpitations.   Gastrointestinal:  Positive for nausea. Negative for abdominal pain and vomiting.   Genitourinary:  Negative for dysuria and hematuria.   Musculoskeletal:  Positive for arthralgias and myalgias. Negative for back pain.   Skin:  Negative for color change and rash.   Neurological:  Negative for seizures and syncope.   All other systems reviewed and are negative.         --------------------------------------------- PAST HISTORY ---------------------------------------------  Past Medical History:  has no past medical history on file.    Past Surgical History:  has no past surgical history on file.    Social History:  reports that she has never smoked. She has never been exposed to tobacco smoke. She has never used smokeless tobacco. She reports that she does not  currently use alcohol. She reports that she does not use drugs.    Family History: family history is not on file. Unless otherwise noted, family history is non contributory    Patient's Medications   New Prescriptions    IBUPROFEN 600 MG TABLET    Take 1 tablet (600 mg) by mouth every 8 hours if needed for mild pain (1 - 3) or fever (temp greater than 38.0 C).   Previous Medications    ALBUTEROL (VENTOLIN HFA) 90 MCG/ACTUATION INHALER    INHALE 2 PUFFS BY MOUTH EVERY 6 HOURS AS NEEDED    ALBUTEROL 90 MCG/ACTUATION INHALER    INHALE 1-2 PUFFS EVERY 6 HOURS IF NEEDED FOR WHEEZING.    BUSPIRONE (BUSPAR) 15 MG TABLET    Take by mouth.    CIPROFLOXACIN (CILOXAN) 0.3 % OPHTHALMIC SOLUTION    INSTILL 1 DROP INTO AFFECTED EYE EVERY HOUR FOR 2 DAYS WHEN AWAKE THEN EVERY 4 HOURS FOR 4 MORE DAYS    ESCITALOPRAM (LEXAPRO) 20 MG TABLET    TAKE 1 TABLET BY MOUTH EVERY DAY    HYDROXYZINE HCL (ATARAX) 25 MG TABLET    TAKE 1 TO 2 TABLETS BY MOUTH EVERY DAY AT BEDTIME    TRAZODONE (DESYREL) 50 MG TABLET    Take 1 tablet (50 mg) by mouth as needed at bedtime for sleep.    TRAZODONE (DESYREL) 50 MG TABLET    TAKE 1 TABLET BY MOUTH AT BEDTIME AS NEEDED FOR SLEEP    VENTOLIN HFA 90 MCG/ACTUATION INHALER    Inhale 2 puffs every 6 hours if needed.   Modified Medications    No medications on file   Discontinued Medications    No medications on file      The patient’s home medications have been reviewed.    Allergies: Patient has no known allergies.    -------------------------------------------------- RESULTS -------------------------------------------------  All laboratory and radiology results have been personally reviewed by myself   LABS:  Labs Reviewed   SARS-COV-2 PCR - Abnormal       Result Value    Coronavirus 2019, PCR Detected (*)     Narrative:     This assay has received FDA Emergency Use Authorization (EUA) and is only authorized for the duration of time that circumstances exist to justify the authorization of the emergency use  "of in vitro diagnostic tests for the detection of SARS-CoV-2 virus and/or diagnosis of COVID-19 infection under section 564(b)(1) of the Act, 21 U.S.C. 360bbb-3(b)(1). This assay is an in vitro diagnostic nucleic acid amplification test for the qualitative detection of SARS-CoV-2 from nasopharyngeal specimens and has been validated for use at [facility]. Negative results do not preclude COVID-19 infections and should not be used as the sole basis for diagnosis, treatment, or other management decisions.     INFLUENZA A AND B PCR - Normal    Flu A Result Not Detected      Flu B Result Not Detected      Narrative:     This assay is an in vitro diagnostic multiplex nucleic acid amplification test for the detection and discrimination of Influenza A & B from nasopharyngeal specimens, and has been validated for use at [facility]. Negative results do not preclude Influenza A/B infections, and should not be used as the sole basis for diagnosis, treatment, or other management decisions. If Influenza A/B and RSV PCR results are negative, testing for Parainfluenza virus, Adenovirus and Metapneumovirus is routinely performed for [facility] [place] in[place], and is available on other patients by placing an add-on request.         RADIOLOGY:  Interpreted by Radiologist.  No orders to display       No results found for this or any previous visit (from the past 4464 hour(s)).  ------------------------- NURSING NOTES AND VITALS REVIEWED ---------------------------   The nursing notes within the ED encounter and vital signs as below have been reviewed.   /71   Pulse 98   Temp 36.4 °C (97.6 °F) (Temporal)   Resp 15   Ht 1.6 m (5' 3\")   Wt 52.6 kg (116 lb)   SpO2 100%   BMI 20.55 kg/m²   Oxygen Saturation Interpretation: Normal      ---------------------------------------------------PHYSICAL EXAM--------------------------------------  Physical " Exam  Vitals and nursing note reviewed.   Constitutional:       General: She is not in acute distress.     Appearance: She is well-developed. She is not ill-appearing or toxic-appearing.   HENT:      Head: Normocephalic and atraumatic.      Right Ear: Tympanic membrane, ear canal and external ear normal.      Left Ear: Tympanic membrane, ear canal and external ear normal.      Nose: Rhinorrhea present.      Mouth/Throat:      Mouth: Mucous membranes are moist.      Pharynx: Oropharynx is clear. No oropharyngeal exudate or posterior oropharyngeal erythema.   Eyes:      General: No scleral icterus.     Conjunctiva/sclera: Conjunctivae normal.   Cardiovascular:      Rate and Rhythm: Normal rate and regular rhythm.      Pulses: Normal pulses.      Heart sounds: Normal heart sounds. No murmur heard.  Pulmonary:      Effort: Pulmonary effort is normal. No respiratory distress.      Breath sounds: Normal breath sounds. No wheezing, rhonchi or rales.   Abdominal:      General: Bowel sounds are normal. There is no distension.      Palpations: Abdomen is soft.      Tenderness: There is no abdominal tenderness. There is no guarding or rebound.   Musculoskeletal:         General: No swelling, tenderness, deformity or signs of injury.      Cervical back: Normal range of motion and neck supple. No rigidity or tenderness.      Right lower leg: No edema.      Left lower leg: No edema.   Lymphadenopathy:      Cervical: No cervical adenopathy.   Skin:     General: Skin is warm and dry.      Capillary Refill: Capillary refill takes less than 2 seconds.      Findings: No rash.   Neurological:      Mental Status: She is alert and oriented to person, place, and time.   Psychiatric:         Mood and Affect: Mood normal.            Procedures  NONE  ------------------------------ ED COURSE/MEDICAL DECISION MAKING----------------------    Medical Decision Making:   Patient was seen and examined by me.  Vital signs are stable she is afebrile  she is not toxic.  Heart rate is 98 bpm.  Respiratory rate 15 unlabored with a pulse ox of 100% room air.  Temp is 97.6.  /71.  Heart was regular rate and rhythm.  Lungs are clear to auscultation.    Testing for COVID and influenza was obtained.  COVID Test is positive.  Will DC home with Coronavirus instructions  Tylenol and/or ibuprofen as needed for pain  Push clear fluids  Follow up with primary care in 5-7 days as needed/ sooner if worse or new worrisome symptoms    ED Course as of 08/29/24 1334   u Aug 29, 2024   1330 Sars-CoV-2 PCR(!)  Coronavirus is detected/positive    Influenza A/B is not detected/not detected [EC]      ED Course User Index  [EC] Gregorio Martínez DO         Diagnoses as of 08/29/24 1334   COVID-19      Counseling:   The emergency provider has spoken with the patient and discussed today’s results, in addition to providing specific details for the plan of care and counseling regarding the diagnosis and prognosis.  Questions are answered at this time and they are agreeable with the plan.      --------------------------------- IMPRESSION AND DISPOSITION ---------------------------------        IMPRESSION  1. COVID-19        DISPOSITION  Disposition: Discharge to home  Patient condition is fair      Billing Provider Critical Care Time: 0 minutes     Gregorio Martínez DO  08/29/24 [id number]

## 2024-08-29 NOTE — Clinical Note
Candelaria Robins was seen and treated in our emergency department on 8/29/2024.  She may return to work on 09/02/2024.       If you have any questions or concerns, please don't hesitate to call.      Gregorio Martínez, DO

## 2024-09-04 ENCOUNTER — APPOINTMENT (OUTPATIENT)
Dept: RADIOLOGY | Facility: HOSPITAL | Age: 29
End: 2024-09-04
Payer: COMMERCIAL

## 2024-09-04 ENCOUNTER — HOSPITAL ENCOUNTER (EMERGENCY)
Facility: HOSPITAL | Age: 29
Discharge: HOME | End: 2024-09-04
Attending: EMERGENCY MEDICINE
Payer: COMMERCIAL

## 2024-09-04 VITALS
DIASTOLIC BLOOD PRESSURE: 83 MMHG | BODY MASS INDEX: 21.26 KG/M2 | HEIGHT: 63 IN | WEIGHT: 120 LBS | RESPIRATION RATE: 18 BRPM | OXYGEN SATURATION: 99 % | TEMPERATURE: 97.1 F | SYSTOLIC BLOOD PRESSURE: 123 MMHG | HEART RATE: 85 BPM

## 2024-09-04 DIAGNOSIS — U07.1 COVID-19: Primary | ICD-10-CM

## 2024-09-04 PROCEDURE — 71045 X-RAY EXAM CHEST 1 VIEW: CPT | Performed by: RADIOLOGY

## 2024-09-04 PROCEDURE — 99283 EMERGENCY DEPT VISIT LOW MDM: CPT | Mod: 25

## 2024-09-04 PROCEDURE — 71045 X-RAY EXAM CHEST 1 VIEW: CPT

## 2024-09-04 ASSESSMENT — COLUMBIA-SUICIDE SEVERITY RATING SCALE - C-SSRS
1. IN THE PAST MONTH, HAVE YOU WISHED YOU WERE DEAD OR WISHED YOU COULD GO TO SLEEP AND NOT WAKE UP?: NO
2. HAVE YOU ACTUALLY HAD ANY THOUGHTS OF KILLING YOURSELF?: NO
6. HAVE YOU EVER DONE ANYTHING, STARTED TO DO ANYTHING, OR PREPARED TO DO ANYTHING TO END YOUR LIFE?: NO

## 2024-09-04 ASSESSMENT — PAIN SCALES - GENERAL
PAINLEVEL_OUTOF10: 0 - NO PAIN
PAINLEVEL_OUTOF10: 0 - NO PAIN

## 2024-09-04 ASSESSMENT — PAIN - FUNCTIONAL ASSESSMENT: PAIN_FUNCTIONAL_ASSESSMENT: 0-10

## 2024-09-04 ASSESSMENT — PAIN DESCRIPTION - PROGRESSION: CLINICAL_PROGRESSION: NOT CHANGED

## 2024-09-04 NOTE — ED PROVIDER NOTES
[facility]   ED  Provider Note  9/4/2024 10:02 AM  AC10/AC10      Chief Complaint   Patient presents with    Cough     Pt is covid positive states chest hurts with cough and deep breath.         History of Present Illness:   Candelaria Robins is a 29 y.o. female presenting to the ED for pain with coughing and deep breathing, beginning 3 days ago.  The complaint has been intermittent, mild to moderate in severity, and worsened by deep breathing and coughing.  Patient was diagnosed been COVID-positive on the 29th of last month, 5 days ago.  She continues to have some bodyaches and pains mild headache and occasional cough and some chest discomfort with coughing.  She does not feel short of breath.  She denies any further nausea or vomiting.  She has no diarrhea.  She does complain of some bodyaches and a mild headache.      Review of Systems:   Pertinent positives and review of systems as noted above.  Remaining 10 review of systems is negative or noncontributory to today's episode of care.  Review of Systems       --------------------------------------------- PAST HISTORY ---------------------------------------------  Past Medical History: History reviewed. No pertinent past medical history.     Past Surgical History: History reviewed. No pertinent surgical history.     Social History:   Social History     Social History Narrative    Not on file        Family History: family history is not on file. Unless otherwise noted, family history is non contributory    Patient's Medications   New Prescriptions    No medications on file   Previous Medications    ALBUTEROL (VENTOLIN HFA) 90 MCG/ACTUATION INHALER    INHALE 2 PUFFS BY MOUTH EVERY 6 HOURS AS NEEDED    ALBUTEROL 90 MCG/ACTUATION INHALER    INHALE 1-2 PUFFS EVERY 6 HOURS IF NEEDED FOR WHEEZING.    BUSPIRONE (BUSPAR) 15 MG TABLET    Take by mouth.    CIPROFLOXACIN (CILOXAN) 0.3 % OPHTHALMIC SOLUTION    INSTILL 1 DROP INTO AFFECTED EYE EVERY HOUR FOR 2 DAYS WHEN AWAKE  "THEN EVERY 4 HOURS FOR 4 MORE DAYS    ESCITALOPRAM (LEXAPRO) 20 MG TABLET    TAKE 1 TABLET BY MOUTH EVERY DAY    HYDROXYZINE HCL (ATARAX) 25 MG TABLET    TAKE 1 TO 2 TABLETS BY MOUTH EVERY DAY AT BEDTIME    IBUPROFEN 600 MG TABLET    Take 1 tablet (600 mg) by mouth every 8 hours if needed for mild pain (1 - 3) or fever (temp greater than 38.0 C).    TRAZODONE (DESYREL) 50 MG TABLET    Take 1 tablet (50 mg) by mouth as needed at bedtime for sleep.    TRAZODONE (DESYREL) 50 MG TABLET    TAKE 1 TABLET BY MOUTH AT BEDTIME AS NEEDED FOR SLEEP    VENTOLIN HFA 90 MCG/ACTUATION INHALER    Inhale 2 puffs every 6 hours if needed.   Modified Medications    No medications on file   Discontinued Medications    No medications on file      The patient’s home medications have been reviewed.    Allergies: Patient has no known allergies.    -------------------------------------------------- RESULTS -------------------------------------------------  All laboratory and radiology results have been personally reviewed by myself   LABS:  Labs Reviewed - No data to display      RADIOLOGY:  Interpreted by Radiologist.  XR chest 1 view   Final Result   Borderline hyperinflation. No focal infiltrate.        MACRO:   None.        Signed by: Nataliia Grewal 9/4/2024 10:59 AM   Dictation workstation:   [id number]          No results found for this or any previous visit (from the past 4464 hour(s)).  ------------------------- NURSING NOTES AND VITALS REVIEWED ---------------------------   The nursing notes within the ED encounter and vital signs as below have been reviewed.   /83   Pulse 85   Temp 36.2 °C (97.1 °F)   Resp 18   Ht 1.6 m (5' 3\")   Wt 54.4 kg (120 lb)   SpO2 99%   BMI 21.26 kg/m²   Oxygen Saturation Interpretation: Normal      ---------------------------------------------------PHYSICAL EXAM--------------------------------------  Physical Exam   Constitutional/General: Alert,  ill appearing, non toxic in NAD  Head: " Normocephalic and atraumatic  Eyes: PERRL, EOMI, conjunctiva normal, sclera non icteric  Mouth: Oropharynx clear, handling secretions, no trismus, no asymmetry of the posterior oropharynx or uvular edema  Neck: Supple, full ROM, non tender to palpation in the midline, no stridor, no crepitus, no meningeal signs  Respiratory: Lungs clear to auscultation bilaterally, no wheezes, rales, or rhonchi. Not in respiratory distress, no increased work of breathing, no retractions,  Cardiovascular:  Regular rate. Regular rhythm. No murmurs, gallops, or rubs. 2+ distal pulses  Chest: No chest wall tenderness  GI:  Abdomen Soft, Non tender, Non distended.  +BS. No organomegaly, no palpable masses,  No rebound, guarding, or rigidity.   Musculoskeletal: Moves all extremities x 4. Warm and well perfused, no clubbing, cyanosis, or edema. Capillary refill <3 seconds  Integument: skin warm and dry. No rashes.   Lymphatic: no lymphadenopathy noted  Neurologic: No focal deficits, symmetric strength 5/5 in the upper and lower extremities bilaterally  Psychiatric: Normal Affect    Procedures    ------------------------------ ED COURSE/MEDICAL DECISION MAKING----------------------  Diagnoses as of 09/04/24 1213   COVID-19      Patient has an active COVID-19 infection.  She is complaining of bodyaches and pains and some chest congestion type feeling.  She denies any fever or chills.  She is not feeling short of breath.  Her chest x-ray shows no pneumonia or collapsed lung.  Her vital signs are stable.  She is stable for outpatient management.      Medical Decision Making:   Discharged to home  Diagnoses as of 09/04/24 1213   COVID-19      Counseling:   The emergency provider has spoken with the patient and discussed today’s results, in addition to providing specific details for the plan of care and counseling regarding the diagnosis and prognosis.  Questions are answered at this time and they are agreeable with the  plan.      --------------------------------- IMPRESSION AND DISPOSITION ---------------------------------        IMPRESSION  1. COVID-19        DISPOSITION  Disposition: Discharge to home  Patient condition is fair      Billing Provider Critical Care Time: 0 minutes     Dutch Alejandra MD  09/04/24 [id number]

## 2024-09-04 NOTE — DISCHARGE INSTRUCTIONS
Use over-the-counter medications for symptomatic relief of cough and cold type symptoms.    Encourage oral fluids.    Follow-up with your primary care doctor 1 week if not better.    Return for worsening symptoms or concerns.

## 2024-10-03 ENCOUNTER — HOSPITAL ENCOUNTER (EMERGENCY)
Facility: HOSPITAL | Age: 29
Discharge: OTHER INSTITUTION | End: 2024-10-04
Attending: EMERGENCY MEDICINE
Payer: COMMERCIAL

## 2024-10-03 PROCEDURE — 99285 EMERGENCY DEPT VISIT HI MDM: CPT | Mod: 25

## 2024-10-03 ASSESSMENT — PAIN - FUNCTIONAL ASSESSMENT: PAIN_FUNCTIONAL_ASSESSMENT: 0-10

## 2024-10-03 ASSESSMENT — PAIN SCALES - GENERAL: PAINLEVEL_OUTOF10: 0 - NO PAIN

## 2024-10-04 ENCOUNTER — APPOINTMENT (OUTPATIENT)
Dept: CARDIOLOGY | Facility: HOSPITAL | Age: 29
End: 2024-10-04
Payer: COMMERCIAL

## 2024-10-04 VITALS
WEIGHT: 120 LBS | OXYGEN SATURATION: 99 % | DIASTOLIC BLOOD PRESSURE: 64 MMHG | HEART RATE: 84 BPM | BODY MASS INDEX: 21.26 KG/M2 | TEMPERATURE: 97.3 F | HEIGHT: 63 IN | SYSTOLIC BLOOD PRESSURE: 124 MMHG | RESPIRATION RATE: 15 BRPM

## 2024-10-04 LAB
ALBUMIN SERPL BCP-MCNC: 4.4 G/DL (ref 3.4–5)
ALP SERPL-CCNC: 36 U/L (ref 33–110)
ALT SERPL W P-5'-P-CCNC: 9 U/L (ref 7–45)
AMPHETAMINES UR QL SCN: NORMAL
ANION GAP SERPL CALC-SCNC: 11 MMOL/L (ref 10–20)
APAP SERPL-MCNC: 20.6 UG/ML
APPEARANCE UR: ABNORMAL
AST SERPL W P-5'-P-CCNC: 11 U/L (ref 9–39)
BARBITURATES UR QL SCN: NORMAL
BENZODIAZ UR QL SCN: NORMAL
BILIRUB SERPL-MCNC: 0.6 MG/DL (ref 0–1.2)
BILIRUB UR STRIP.AUTO-MCNC: ABNORMAL MG/DL
BUN SERPL-MCNC: 14 MG/DL (ref 6–23)
BZE UR QL SCN: NORMAL
CALCIUM SERPL-MCNC: 9.5 MG/DL (ref 8.6–10.3)
CANNABINOIDS UR QL SCN: NORMAL
CHLORIDE SERPL-SCNC: 107 MMOL/L (ref 98–107)
CO2 SERPL-SCNC: 24 MMOL/L (ref 21–32)
COLOR UR: ABNORMAL
CREAT SERPL-MCNC: 0.75 MG/DL (ref 0.5–1.05)
EGFRCR SERPLBLD CKD-EPI 2021: >90 ML/MIN/1.73M*2
ERYTHROCYTE [DISTWIDTH] IN BLOOD BY AUTOMATED COUNT: 14.1 % (ref 11.5–14.5)
ETHANOL SERPL-MCNC: <10 MG/DL
FENTANYL+NORFENTANYL UR QL SCN: NORMAL
GLUCOSE SERPL-MCNC: 91 MG/DL (ref 74–99)
GLUCOSE UR STRIP.AUTO-MCNC: NEGATIVE MG/DL
HCG UR QL IA.RAPID: NEGATIVE
HCT VFR BLD AUTO: 36.5 % (ref 36–46)
HGB BLD-MCNC: 11.9 G/DL (ref 12–16)
HOLD SPECIMEN: NORMAL
KETONES UR STRIP.AUTO-MCNC: ABNORMAL MG/DL
LEUKOCYTE ESTERASE UR QL STRIP.AUTO: NEGATIVE
MCH RBC QN AUTO: 26.2 PG (ref 26–34)
MCHC RBC AUTO-ENTMCNC: 32.6 G/DL (ref 32–36)
MCV RBC AUTO: 80 FL (ref 80–100)
METHADONE UR QL SCN: NORMAL
MUCOUS THREADS #/AREA URNS AUTO: NORMAL /LPF
NITRITE UR QL STRIP.AUTO: NEGATIVE
NRBC BLD-RTO: 0 /100 WBCS (ref 0–0)
OPIATES UR QL SCN: NORMAL
OXYCODONE+OXYMORPHONE UR QL SCN: NORMAL
PCP UR QL SCN: NORMAL
PH UR STRIP.AUTO: 5 [PH]
PLATELET # BLD AUTO: 266 X10*3/UL (ref 150–450)
POTASSIUM SERPL-SCNC: 3.7 MMOL/L (ref 3.5–5.3)
PROT SERPL-MCNC: 7 G/DL (ref 6.4–8.2)
PROT UR STRIP.AUTO-MCNC: ABNORMAL MG/DL
RBC # BLD AUTO: 4.55 X10*6/UL (ref 4–5.2)
RBC # UR STRIP.AUTO: NEGATIVE /UL
RBC #/AREA URNS AUTO: NORMAL /HPF
SALICYLATES SERPL-MCNC: <3 MG/DL
SODIUM SERPL-SCNC: 138 MMOL/L (ref 136–145)
SP GR UR STRIP.AUTO: 1.05
SQUAMOUS #/AREA URNS AUTO: NORMAL /HPF
UROBILINOGEN UR STRIP.AUTO-MCNC: <2 MG/DL
WBC # BLD AUTO: 4.4 X10*3/UL (ref 4.4–11.3)
WBC #/AREA URNS AUTO: NORMAL /HPF

## 2024-10-04 PROCEDURE — 36415 COLL VENOUS BLD VENIPUNCTURE: CPT | Performed by: EMERGENCY MEDICINE

## 2024-10-04 PROCEDURE — 80143 DRUG ASSAY ACETAMINOPHEN: CPT | Performed by: EMERGENCY MEDICINE

## 2024-10-04 PROCEDURE — 82077 ASSAY SPEC XCP UR&BREATH IA: CPT | Performed by: EMERGENCY MEDICINE

## 2024-10-04 PROCEDURE — 81001 URINALYSIS AUTO W/SCOPE: CPT | Performed by: EMERGENCY MEDICINE

## 2024-10-04 PROCEDURE — 80053 COMPREHEN METABOLIC PANEL: CPT | Performed by: EMERGENCY MEDICINE

## 2024-10-04 PROCEDURE — 80179 DRUG ASSAY SALICYLATE: CPT | Performed by: EMERGENCY MEDICINE

## 2024-10-04 PROCEDURE — 80307 DRUG TEST PRSMV CHEM ANLYZR: CPT | Performed by: EMERGENCY MEDICINE

## 2024-10-04 PROCEDURE — 93005 ELECTROCARDIOGRAM TRACING: CPT

## 2024-10-04 PROCEDURE — 85027 COMPLETE CBC AUTOMATED: CPT | Performed by: EMERGENCY MEDICINE

## 2024-10-04 PROCEDURE — 81025 URINE PREGNANCY TEST: CPT | Performed by: EMERGENCY MEDICINE

## 2024-10-04 PROCEDURE — 2500000001 HC RX 250 WO HCPCS SELF ADMINISTERED DRUGS (ALT 637 FOR MEDICARE OP): Mod: SE | Performed by: EMERGENCY MEDICINE

## 2024-10-04 RX ORDER — ACETAMINOPHEN 325 MG/1
650 TABLET ORAL ONCE
Status: COMPLETED | OUTPATIENT
Start: 2024-10-04 | End: 2024-10-04

## 2024-10-04 SDOH — HEALTH STABILITY: MENTAL HEALTH
HAVE YOU STARTED TO WORK OUT OR WORKED OUT THE DETAILS OF HOW TO KILL YOURSELF? DO YOU INTENT TO CARRY OUT THIS PLAN?: NO

## 2024-10-04 SDOH — HEALTH STABILITY: MENTAL HEALTH: IN THE PAST FEW WEEKS, HAVE YOU WISHED YOU WERE DEAD?: YES

## 2024-10-04 SDOH — HEALTH STABILITY: MENTAL HEALTH: ACTIVE SUICIDAL IDEATION WITH SOME INTENT TO ACT, WITHOUT SPECIFIC PLAN (PAST 1 MONTH): NO

## 2024-10-04 SDOH — HEALTH STABILITY: MENTAL HEALTH: HAVE YOU EVER DONE ANYTHING, STARTED TO DO ANYTHING, OR PREPARED TO DO ANYTHING TO END YOUR LIFE?: YES

## 2024-10-04 SDOH — HEALTH STABILITY: MENTAL HEALTH: SUICIDAL BEHAVIOR (DESCRIPTION): NO ATTEMPT

## 2024-10-04 SDOH — HEALTH STABILITY: MENTAL HEALTH: SUICIDE ASSESSMENT: ADULT (C-SSRS)

## 2024-10-04 SDOH — HEALTH STABILITY: MENTAL HEALTH: HAVE YOU EVER TRIED TO KILL YOURSELF?: YES

## 2024-10-04 SDOH — HEALTH STABILITY: MENTAL HEALTH: HAVE YOU BEEN THINKING ABOUT HOW YOU MIGHT DO THIS?: NO

## 2024-10-04 SDOH — HEALTH STABILITY: MENTAL HEALTH: BEHAVIORAL HEALTH(WDL): WITHIN DEFINED LIMITS

## 2024-10-04 SDOH — HEALTH STABILITY: MENTAL HEALTH: DEPRESSION SYMPTOMS: FEELINGS OF HELPLESSNESS;FEELINGS OF HOPELESSESS;FEELINGS OF WORTHLESSNESS

## 2024-10-04 SDOH — HEALTH STABILITY: MENTAL HEALTH: WISH TO BE DEAD (PAST 1 MONTH): YES

## 2024-10-04 SDOH — HEALTH STABILITY: MENTAL HEALTH: NON-SPECIFIC ACTIVE SUICIDAL THOUGHTS (PAST 1 MONTH): YES

## 2024-10-04 SDOH — HEALTH STABILITY: MENTAL HEALTH: ARE YOU HAVING THOUGHTS OF KILLING YOURSELF RIGHT NOW?: YES

## 2024-10-04 SDOH — HEALTH STABILITY: MENTAL HEALTH
OTHER SUICIDE PRECAUTIONS INCLUDE: PATIENT PLACED IN GOWN (SNAPS OR PAPER GOWNS PREFERRED) AND WANDED;REMAINING RISKS IDENTIFIED AND MITIGATED;PATIENT PLACED IN PSYCH SAFE ROOM (IF AVAILABLE);PROVIDER NOTIFIED;ELOPEMENT RISK IDENTIFIED;FREQUENT ROUNDING WITH IRREGULAR CHECKS AT MINIMUM OF

## 2024-10-04 SDOH — HEALTH STABILITY: MENTAL HEALTH: WAS THIS WITHIN THE PAST THREE MONTHS?: NO

## 2024-10-04 SDOH — HEALTH STABILITY: MENTAL HEALTH: HAVE YOU ACTUALLY HAD ANY THOUGHTS OF KILLING YOURSELF?: YES

## 2024-10-04 SDOH — HEALTH STABILITY: MENTAL HEALTH: ACTIVE SUICIDAL IDEATION WITH SPECIFIC PLAN AND INTENT (PAST 1 MONTH): NO

## 2024-10-04 SDOH — HEALTH STABILITY: MENTAL HEALTH: HAVE YOU HAD THESE THOUGHTS AND HAD SOME INTENTION OF ACTING ON THEM?: NO

## 2024-10-04 SDOH — HEALTH STABILITY: MENTAL HEALTH: HOW DID YOU TRY TO KILL YOURSELF?: OD ON PILLS

## 2024-10-04 SDOH — HEALTH STABILITY: MENTAL HEALTH: SUICIDAL BEHAVIOR (3 MONTHS): NO

## 2024-10-04 SDOH — HEALTH STABILITY: MENTAL HEALTH: WHEN DID YOU TRY TO KILL YOURSELF?: 2019

## 2024-10-04 SDOH — HEALTH STABILITY: MENTAL HEALTH: SUICIDAL BEHAVIOR (LIFETIME): YES

## 2024-10-04 SDOH — HEALTH STABILITY: MENTAL HEALTH: IN THE PAST WEEK, HAVE YOU BEEN HAVING THOUGHTS ABOUT KILLING YOURSELF?: YES

## 2024-10-04 SDOH — ECONOMIC STABILITY: HOUSING INSECURITY: FEELS SAFE LIVING IN HOME: YES

## 2024-10-04 SDOH — HEALTH STABILITY: MENTAL HEALTH: ANXIETY SYMPTOMS: GENERALIZED;FEELINGS OF DOOM

## 2024-10-04 SDOH — HEALTH STABILITY: MENTAL HEALTH: DESCRIBE YOUR THOUGHTS OF KILLING YOURSELF RIGHT NOW:: NO PLAN

## 2024-10-04 SDOH — HEALTH STABILITY: MENTAL HEALTH: IN THE PAST FEW WEEKS, HAVE YOU FELT THAT YOU OR YOUR FAMILY WOULD BE BETTER OFF IF YOU WERE DEAD?: YES

## 2024-10-04 SDOH — HEALTH STABILITY: MENTAL HEALTH: HAVE YOU WISHED YOU WERE DEAD OR WISHED YOU COULD GO TO SLEEP AND NOT WAKE UP?: YES

## 2024-10-04 ASSESSMENT — LIFESTYLE VARIABLES
SUBSTANCE_ABUSE_PAST_12_MONTHS: NO
PRESCIPTION_ABUSE_PAST_12_MONTHS: NO

## 2024-10-04 ASSESSMENT — PAIN SCALES - GENERAL
PAINLEVEL_OUTOF10: 0 - NO PAIN
PAINLEVEL_OUTOF10: 0 - NO PAIN

## 2024-10-04 NOTE — SIGNIFICANT EVENT
Application for Emergency Admission      Ready for Transfer?  Is the patient medically cleared for transfer to inpatient psychiatry: Yes  Has the patient been accepted to an inpatient psychiatric hospital: Yes    Application for Emergency Admission  IN ACCORDANCE WITH [place]  The Chief Clinical Officer of: Fanny Long 10/4/2024 .5:41 AM    Reason for Hospitalization  The undersigned has reason to believe that: Candelaria Robins Is a mentally ill person subject to hospitalization by court order under division B Section 5122.01 of the Revised Code, i.e., this person:    1.Yes  Represents a substantial risk of physical harm to self as manifested by evidence of threats of, or attempts at, suicide or serious self-inflicted bodily harm    2.Yes Represents a substantial risk of physical harm to others as manifested by evidence of recent homicidal or other violent behavior, evidence of recent threats that place another in reasonable fear of violent behavior and serious physical harm, or other evidence of present dangerousness    3.Yes Represents a substantial and immediate risk of serious physical impairment or injury to self as manifested by  evidence that the person is unable to provide for and is not providing for the person's basic physical needs because of the person's mental illness and that appropriate provision for those needs cannot be made  immediately available in the community    4.Yes Would benefit from treatment in a hospital for his mental illness and is in need of such treatment as manifested by evidence of behavior that creates a grave and imminent risk to substantial rights of others or  himself.    5.Yes Would benefit from treatment as manifested by evidence of behavior that indicates all of the following:       (a) The person is unlikely to survive safely in the community without supervision, based on a clinical determination.       (b) The person has a history of lack of  compliance with treatment for mental illness and one of the following applies:      (i) At least twice within the thirty-six months prior to the filing of an affidavit seeking court-ordered treatment of the person under section 5122.111 of the Revised Code, the lack of compliance has been a significant factor in necessitating hospitalization in a hospital or receipt of services in a forensic or other mental health unit of a correctional facility, provided that the thirty-six-month period shall be extended by the length of any hospitalization or incarceration of the person that occurred within the thirty-six-month period.      (ii) Within the forty-eight months prior to the filing of an affidavit seeking court-ordered treatment of the person under section 5122.111 of the Revised Code, the lack of compliance resulted in one or more acts of serious violent behavior toward self or others or threats of, or attempts at, serious physical harm to self or others, provided that the forty-eight-month period shall be extended by the length of any hospitalization or incarceration of the person that occurred within the forty-eight-month period.      (c) The person, as a result of mental illness, is unlikely to voluntarily participate in necessary treatment.       (d) In view of the person's treatment history and current behavior, the person is in need of treatment in order to prevent a relapse or deterioration that would be likely to result in substantial risk of serious harm to the person or others.    (e) Represents a substantial risk of physical harm to self or others if allowed to remain at liberty pending examination.    Therefore, it is requested that said person be admitted to the above named facility.    STATEMENT OF BELIEF    Must be filled out by one of the following: a psychiatrist, licensed physician, licensed clinical psychologist, health or ,  or .  (Statement shall include the  circumstances under which the individual was taken into custody and the reason for the person's belief that hospitalization is necessary. The statement shall also include a reference to efforts made to secure the individual's property at his residence if he was taken into custody there. Every reasonable and appropriate effort should be made to take this person into custody in the least conspicuous manner possible.)    uh     Eliecer Hodge MD 10/4/2024     _____________________________________________________________   Place of Employment: [facility]    STATEMENT OF OBSERVATION BY PSYCHIATRIST, LICENSED PHYSICIAN, OR LICENSED CLINICAL PSYCHOLOGIST, IF APPLICABLE    Place of Observation (e.g., [facility] center, general hospital, office, emergency facility)  (If applicable, please complete)    Eliecer Hodge MD 10/4/2024    _____________________________________________________________
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
## 2024-10-04 NOTE — ED NOTES
Sister Jackie available to contact for additional info- [phone].     Gopal Martinez RN  10/04/24 8355

## 2024-10-05 LAB
ATRIAL RATE: 68 BPM
P AXIS: 68 DEGREES
P OFFSET: 186 MS
P ONSET: 145 MS
PR INTERVAL: 150 MS
Q ONSET: 220 MS
QRS COUNT: 11 BEATS
QRS DURATION: 82 MS
QT INTERVAL: 404 MS
QTC CALCULATION(BAZETT): 429 MS
QTC FREDERICIA: 421 MS
R AXIS: 1 DEGREES
T AXIS: 50 DEGREES
T OFFSET: 422 MS
VENTRICULAR RATE: 68 BPM

## 2024-11-05 ENCOUNTER — HOSPITAL ENCOUNTER (EMERGENCY)
Facility: HOSPITAL | Age: 29
Discharge: HOME | End: 2024-11-05
Attending: EMERGENCY MEDICINE
Payer: COMMERCIAL

## 2024-11-05 VITALS
HEART RATE: 64 BPM | SYSTOLIC BLOOD PRESSURE: 116 MMHG | TEMPERATURE: 98.6 F | WEIGHT: 116 LBS | HEIGHT: 63 IN | DIASTOLIC BLOOD PRESSURE: 64 MMHG | BODY MASS INDEX: 20.55 KG/M2 | OXYGEN SATURATION: 97 % | RESPIRATION RATE: 20 BRPM

## 2024-11-05 DIAGNOSIS — U07.1 COVID-19: ICD-10-CM

## 2024-11-05 DIAGNOSIS — M54.9 UPPER BACK PAIN ON RIGHT SIDE: Primary | ICD-10-CM

## 2024-11-05 PROCEDURE — 2500000001 HC RX 250 WO HCPCS SELF ADMINISTERED DRUGS (ALT 637 FOR MEDICARE OP): Mod: SE | Performed by: EMERGENCY MEDICINE

## 2024-11-05 PROCEDURE — 99283 EMERGENCY DEPT VISIT LOW MDM: CPT

## 2024-11-05 RX ORDER — IBUPROFEN 400 MG/1
400 TABLET ORAL ONCE
Status: COMPLETED | OUTPATIENT
Start: 2024-11-05 | End: 2024-11-05

## 2024-11-05 RX ORDER — CYCLOBENZAPRINE HCL 10 MG
10 TABLET ORAL ONCE
Status: COMPLETED | OUTPATIENT
Start: 2024-11-05 | End: 2024-11-05

## 2024-11-05 RX ORDER — CYCLOBENZAPRINE HCL 10 MG
10 TABLET ORAL 3 TIMES DAILY PRN
Qty: 15 TABLET | Refills: 0 | Status: SHIPPED | OUTPATIENT
Start: 2024-11-05 | End: 2024-11-15

## 2024-11-05 RX ORDER — IBUPROFEN 400 MG/1
400 TABLET ORAL EVERY 6 HOURS
Qty: 20 TABLET | Refills: 0 | Status: SHIPPED | OUTPATIENT
Start: 2024-11-05 | End: 2024-11-10

## 2024-11-05 ASSESSMENT — PAIN DESCRIPTION - LOCATION
LOCATION: SHOULDER
LOCATION_2: BACK

## 2024-11-05 ASSESSMENT — PAIN SCALES - GENERAL
PAINLEVEL_OUTOF10: 9
PAINLEVEL_OUTOF10: 5 - MODERATE PAIN

## 2024-11-05 ASSESSMENT — PAIN - FUNCTIONAL ASSESSMENT: PAIN_FUNCTIONAL_ASSESSMENT: 0-10

## 2024-11-05 ASSESSMENT — PAIN DESCRIPTION - ORIENTATION
ORIENTATION_2: UPPER
ORIENTATION: RIGHT

## 2024-11-06 NOTE — ED PROVIDER NOTES
"HPI   Chief Complaint   Patient presents with   • Shoulder Pain   • Back Pain     Pt to ED for reports of middle to right shoulder and upper back pain. States she may have slept on it wrong or been pulled by her dog. She has had increased urinary frequency which she says she usually takes medicine OTC for.          History provided by:  Medical records and patient   used: No      This patient presents to the emergency department ambulatory via private vehicle with right scapular and trapezius pain since she got up this morning.  Patient denies any specific injury, but notes her dog may have tugged on her when walking or she may have \"slept wrong\".  She took Tylenol without any improvement.  Pain is right medial scapular area goes up to the trapezius ridge and the side of her neck.  No numbness or tingling of extremities.    Patient denies any recent fevers, chills, coughs, URI symptoms.  No abdominal pain, nausea, vomiting, diarrhea.  Patient states she has symptoms increased urinary frequency today without any dysuria or hematuria.  She declines to give urine specimen stating that she will take some over-the-counter Azo and follow-up with her doctor if it is not improved.      Patient History   History reviewed. No pertinent past medical history.  History reviewed. No pertinent surgical history.  No family history on file.  Social History     Tobacco Use   • Smoking status: Never     Passive exposure: Never   • Smokeless tobacco: Never   Substance Use Topics   • Alcohol use: Yes     Comment: occ   • Drug use: Never       Physical Exam   ED Triage Vitals [11/05/24 2005]   Temperature Heart Rate Respirations BP   37 °C (98.6 °F) 74 19 126/84      SpO2 Temp Source Heart Rate Source Patient Position   98 % Temporal -- --      BP Location FiO2 (%)     -- --       Physical Exam  Vitals reviewed.   Constitutional:       Appearance: Normal appearance.   HENT:      Head: Normocephalic and atraumatic.     "  Nose: Nose normal.      Mouth/Throat:      Mouth: Mucous membranes are moist.   Cardiovascular:      Rate and Rhythm: Normal rate and regular rhythm.      Pulses: Normal pulses.      Heart sounds: Normal heart sounds.   Pulmonary:      Effort: Pulmonary effort is normal.      Breath sounds: Normal breath sounds.   Chest:      Chest wall: No tenderness.   Abdominal:      General: Bowel sounds are normal.      Palpations: Abdomen is soft.      Tenderness: There is no abdominal tenderness. There is no right CVA tenderness or left CVA tenderness.   Musculoskeletal:         General: Normal range of motion.      Cervical back: Normal range of motion and neck supple. No tenderness.      Comments: Reproducible tenderness palpation along medial right scapular border in the area of rhomboideus and up onto trapezius ridge.  No midline cervical, thoracic, lumbar spine tenderness.  No acromioclavicular step-off or deformity.  No subcoracoid tenderness.  Normal range of motion of shoulder.  No overlying redness, swelling, bruising, rash.  No bony crepitance.  No subcutaneous emphysema.  No external evidence of trauma   Skin:     General: Skin is warm and dry.      Capillary Refill: Capillary refill takes less than 2 seconds.      Findings: No rash.   Neurological:      General: No focal deficit present.      Mental Status: She is alert and oriented to person, place, and time.      Gait: Gait normal.   Psychiatric:         Mood and Affect: Mood normal.           ED Course & MDM   Diagnoses as of 11/05/24 2027   Upper back pain on right side                 No data recorded                                 Medical Decision Making  Patient presents emergency department with the above history and physical.  No signs of sepsis, dehydration, bronchospasm, hypoxemia, trauma.  No bony tenderness.  No evidence of acute infectious process.  No evidence of neurological or vascular impingement.  Palpable muscle spasm and tenderness in the  rhomboideus and trapezius on the right.  Suspect musculoskeletal etiology for pain.  Prescription for Flexeril on Motrin provided.  Patient declines work note.    Patient had mentioned to nurse she had been having increased urinary frequency, but declines to give urine specimen.  She states that she will take over-the-counter Azo as this has been successful for her before and will return or follow-up with her primary doctor if she does not get relief.    Results of exam and any testing were discussed with patient/family. To the best of my ability, I answered all questions. At this time, there is no indication for admission/transfer or further diagnostic testing. Patient understands to return for any new or worsening symptoms, or failure to improve as anticipated. The importance of follow-up was stressed.    Procedure  Procedures     Maria E Davis MD  11/05/24 2028

## 2024-12-27 ENCOUNTER — HOSPITAL ENCOUNTER (EMERGENCY)
Facility: HOSPITAL | Age: 29
Discharge: HOME | End: 2024-12-27
Attending: FAMILY MEDICINE
Payer: COMMERCIAL

## 2024-12-27 VITALS
SYSTOLIC BLOOD PRESSURE: 135 MMHG | DIASTOLIC BLOOD PRESSURE: 78 MMHG | OXYGEN SATURATION: 97 % | TEMPERATURE: 97 F | WEIGHT: 118 LBS | HEIGHT: 63 IN | BODY MASS INDEX: 20.91 KG/M2 | RESPIRATION RATE: 17 BRPM | HEART RATE: 95 BPM

## 2024-12-27 DIAGNOSIS — J06.9 UPPER RESPIRATORY TRACT INFECTION, UNSPECIFIED TYPE: Primary | ICD-10-CM

## 2024-12-27 PROCEDURE — 99283 EMERGENCY DEPT VISIT LOW MDM: CPT | Performed by: FAMILY MEDICINE

## 2024-12-27 RX ORDER — FLUTICASONE PROPIONATE 50 MCG
1 SPRAY, SUSPENSION (ML) NASAL DAILY
Qty: 16 G | Refills: 0 | Status: SHIPPED | OUTPATIENT
Start: 2024-12-27 | End: 2025-01-26

## 2024-12-27 RX ORDER — LORATADINE 10 MG/1
10 TABLET ORAL DAILY
Qty: 20 TABLET | Refills: 0 | Status: SHIPPED | OUTPATIENT
Start: 2024-12-27 | End: 2025-01-16

## 2024-12-27 RX ORDER — BENZONATATE 100 MG/1
200 CAPSULE ORAL 3 TIMES DAILY PRN
Qty: 21 CAPSULE | Refills: 0 | Status: SHIPPED | OUTPATIENT
Start: 2024-12-27 | End: 2025-01-03

## 2024-12-27 ASSESSMENT — PAIN SCALES - GENERAL: PAINLEVEL_OUTOF10: 7

## 2024-12-27 ASSESSMENT — PAIN DESCRIPTION - DESCRIPTORS: DESCRIPTORS: BURNING

## 2024-12-27 ASSESSMENT — PAIN DESCRIPTION - LOCATION: LOCATION: THROAT

## 2024-12-27 ASSESSMENT — PAIN - FUNCTIONAL ASSESSMENT: PAIN_FUNCTIONAL_ASSESSMENT: 0-10

## 2024-12-27 ASSESSMENT — PAIN DESCRIPTION - PAIN TYPE: TYPE: ACUTE PAIN

## 2024-12-28 NOTE — ED PROVIDER NOTES
HPI   Chief Complaint   Patient presents with    Cough    Sore Throat       29-year-old female who comes ED with complaint of cough nasal congestion, and sore throat for the last several days to week.  Patient reports family members have had the same symptoms and is steadily gotten over them.  Patient reports she must of gotten what they had earlier and it has continued to persist and not gone away.  Patient reports he is done nothing thus far to treat it and came to the ED to get checked out.  Patient in the ED is alert, cooperative, appears comfortable, and in no distress.  Patient reports no other associate symptoms or complaints.      History provided by:  Patient and medical records   used: No            Patient History   History reviewed. No pertinent past medical history.  History reviewed. No pertinent surgical history.  No family history on file.  Social History     Tobacco Use    Smoking status: Never     Passive exposure: Never    Smokeless tobacco: Never   Substance Use Topics    Alcohol use: Yes     Comment: occ    Drug use: Never       Physical Exam   ED Triage Vitals [12/27/24 1215]   Temperature Heart Rate Respirations BP   36.1 °C (97 °F) 95 17 135/78      SpO2 Temp Source Heart Rate Source Patient Position   97 % Temporal -- --      BP Location FiO2 (%)     -- --       Physical Exam  Vitals and nursing note reviewed.   Constitutional:       General: She is not in acute distress.     Appearance: She is well-developed.   HENT:      Head: Normocephalic and atraumatic.      Nose: Mucosal edema, congestion and rhinorrhea present. Rhinorrhea is clear.      Mouth/Throat:      Lips: Pink.      Mouth: Mucous membranes are moist.      Pharynx: Oropharynx is clear. Uvula midline.   Eyes:      Conjunctiva/sclera: Conjunctivae normal.   Cardiovascular:      Rate and Rhythm: Normal rate and regular rhythm.      Pulses: Normal pulses.      Heart sounds: Normal heart sounds, S1 normal and S2  normal. No murmur heard.  Pulmonary:      Effort: Pulmonary effort is normal. No tachypnea or respiratory distress.      Breath sounds: Normal breath sounds and air entry.   Abdominal:      Palpations: Abdomen is soft.      Tenderness: There is no abdominal tenderness.   Musculoskeletal:         General: No swelling.      Cervical back: Neck supple.   Skin:     General: Skin is warm and dry.      Capillary Refill: Capillary refill takes less than 2 seconds.   Neurological:      Mental Status: She is alert.   Psychiatric:         Mood and Affect: Mood normal.           ED Course & MDM   Diagnoses as of 12/27/24 1936   Upper respiratory tract infection, unspecified type                 No data recorded                                 Medical Decision Making  Pt upon arrival to the ED appeared to be comfortable and in no distress with stable vitals.  Discussed with pt the presenting complaints and clinically findings.  Reviewed with pt the epic chart and counseled the patient on URI like symptoms and appropriate approach to management/treatments.  After assessment and evaluation findings of his exam appear to be consistent with a viral URI and patient was in no distress with appropriate vital signs.  At this time it was felt patient did not require extensive workup and further discussion had with her regarding conservative measures for management of her symptoms.  Patient was educated in use of all, medications for treatment of her symptoms and encouraged her to contact her primary care doctor for follow-up recheck in the next several days.  Patient also encouraged return the ED if she feels her symptoms continue to persist or worsen despite the recommendations.  Patient stable and discharged home      Amount and/or Complexity of Data Reviewed  External Data Reviewed: labs, radiology and notes.    Risk  OTC drugs.  Prescription drug management.        Procedure  Procedures     Jonathan Cardona MD  12/27/24 8144

## 2025-01-01 ENCOUNTER — HOSPITAL ENCOUNTER (EMERGENCY)
Facility: HOSPITAL | Age: 30
Discharge: HOME | End: 2025-01-01
Attending: FAMILY MEDICINE
Payer: COMMERCIAL

## 2025-01-01 VITALS
DIASTOLIC BLOOD PRESSURE: 72 MMHG | WEIGHT: 118 LBS | HEART RATE: 91 BPM | OXYGEN SATURATION: 97 % | HEIGHT: 63 IN | BODY MASS INDEX: 20.91 KG/M2 | SYSTOLIC BLOOD PRESSURE: 109 MMHG | TEMPERATURE: 97.1 F | RESPIRATION RATE: 16 BRPM

## 2025-01-01 DIAGNOSIS — J01.90 ACUTE SINUSITIS, RECURRENCE NOT SPECIFIED, UNSPECIFIED LOCATION: Primary | ICD-10-CM

## 2025-01-01 PROCEDURE — 99283 EMERGENCY DEPT VISIT LOW MDM: CPT | Performed by: FAMILY MEDICINE

## 2025-01-01 RX ORDER — AZITHROMYCIN 250 MG/1
TABLET, FILM COATED ORAL
Qty: 6 TABLET | Refills: 0 | Status: SHIPPED | OUTPATIENT
Start: 2025-01-01 | End: 2025-01-06

## 2025-01-01 ASSESSMENT — PAIN - FUNCTIONAL ASSESSMENT: PAIN_FUNCTIONAL_ASSESSMENT: 0-10

## 2025-01-01 ASSESSMENT — PAIN SCALES - GENERAL: PAINLEVEL_OUTOF10: 0 - NO PAIN

## 2025-01-02 NOTE — ED PROVIDER NOTES
HPI   Chief Complaint   Patient presents with    Sinusitis       29-year-old female comes the ED with complaint of persistent URI-like symptoms for the last several days and have not improved with medications.  Patient reports she was seen in the ED several days earlier and started on medications however she reports they have not been helping and symptoms continue to persist.  Patient was concerned and came back to the ED for evaluation.  Patient reports she did not contact her primary care doctor as of yet for follow-up from the ED from the previous time.  Patient reports no new physical complaints or concerns.      History provided by:  Patient and medical records   used: No            Patient History   History reviewed. No pertinent past medical history.  History reviewed. No pertinent surgical history.  No family history on file.  Social History     Tobacco Use    Smoking status: Never     Passive exposure: Never    Smokeless tobacco: Never   Substance Use Topics    Alcohol use: Yes     Comment: occ    Drug use: Never       Physical Exam   ED Triage Vitals [01/01/25 1219]   Temperature Heart Rate Respirations BP   36.2 °C (97.1 °F) 91 16 109/72      SpO2 Temp Source Heart Rate Source Patient Position   97 % Temporal Monitor Sitting      BP Location FiO2 (%)     Right arm --       Physical Exam  Vitals and nursing note reviewed.   Constitutional:       General: She is not in acute distress.     Appearance: She is well-developed.   HENT:      Head: Normocephalic and atraumatic.      Nose: Mucosal edema, congestion and rhinorrhea present. Rhinorrhea is clear.      Right Sinus: Maxillary sinus tenderness present.      Left Sinus: Maxillary sinus tenderness present.      Mouth/Throat:      Lips: Pink.      Mouth: Mucous membranes are moist.      Pharynx: Oropharynx is clear. Uvula midline.   Eyes:      Conjunctiva/sclera: Conjunctivae normal.   Cardiovascular:      Rate and Rhythm: Normal rate and  regular rhythm.      Pulses: Normal pulses.      Heart sounds: Normal heart sounds, S1 normal and S2 normal. No murmur heard.  Pulmonary:      Effort: Pulmonary effort is normal. No respiratory distress.      Breath sounds: Normal breath sounds and air entry.   Abdominal:      Palpations: Abdomen is soft.      Tenderness: There is no abdominal tenderness.   Musculoskeletal:         General: No swelling.      Cervical back: Neck supple.   Skin:     General: Skin is warm and dry.      Capillary Refill: Capillary refill takes less than 2 seconds.   Neurological:      Mental Status: She is alert.   Psychiatric:         Mood and Affect: Mood normal.           ED Course & MDM   Diagnoses as of 01/01/25 2016   Acute sinusitis, recurrence not specified, unspecified location                 No data recorded                                 Medical Decision Making  Pt upon arrival to the ED appeared to be anxious but comfortable and in no distress with stable vitals.  Discussed with pt the presenting complaints and clinically findings.  Reviewed with pt the epic chart and counseled the patient on URI like symptoms and appropriate approach to management/treatments.  After assessment and evaluation patient's findings of his exam appear to be consistent with upper respiratory function and further review of patient's medications were done and discussed with patient.  Patient was advised to adhere to the medication provided on the previous visit and patient is also given prescription for antibiotics today and educated in appropriate use.  Patient encouraged to time to contact her primary care doctor and follow-up from the 2 ER visits for reevaluation and assessment.  Patient stable to time and discharged home.      Amount and/or Complexity of Data Reviewed  External Data Reviewed: labs, radiology and notes.    Risk  OTC drugs.  Prescription drug management.        Procedure  Procedures     Jonathan Cardona MD  01/01/25 2040

## 2025-04-19 ENCOUNTER — APPOINTMENT (OUTPATIENT)
Dept: RADIOLOGY | Facility: HOSPITAL | Age: 30
End: 2025-04-19
Payer: COMMERCIAL

## 2025-04-19 ENCOUNTER — HOSPITAL ENCOUNTER (EMERGENCY)
Facility: HOSPITAL | Age: 30
Discharge: HOME | End: 2025-04-19
Attending: EMERGENCY MEDICINE
Payer: COMMERCIAL

## 2025-04-19 VITALS
SYSTOLIC BLOOD PRESSURE: 128 MMHG | BODY MASS INDEX: 20.91 KG/M2 | WEIGHT: 118 LBS | TEMPERATURE: 98.1 F | HEART RATE: 94 BPM | RESPIRATION RATE: 17 BRPM | HEIGHT: 63 IN | OXYGEN SATURATION: 100 % | DIASTOLIC BLOOD PRESSURE: 65 MMHG

## 2025-04-19 DIAGNOSIS — M54.2 BILATERAL POSTERIOR NECK PAIN: Primary | ICD-10-CM

## 2025-04-19 LAB
ANION GAP SERPL CALC-SCNC: 11 MMOL/L (ref 10–20)
BASOPHILS # BLD AUTO: 0.05 X10*3/UL (ref 0–0.1)
BASOPHILS NFR BLD AUTO: 0.8 %
BUN SERPL-MCNC: 16 MG/DL (ref 6–23)
CALCIUM SERPL-MCNC: 9.5 MG/DL (ref 8.6–10.3)
CHLORIDE SERPL-SCNC: 108 MMOL/L (ref 98–107)
CO2 SERPL-SCNC: 23 MMOL/L (ref 21–32)
CREAT SERPL-MCNC: 0.67 MG/DL (ref 0.5–1.05)
EGFRCR SERPLBLD CKD-EPI 2021: >90 ML/MIN/1.73M*2
EOSINOPHIL # BLD AUTO: 0.27 X10*3/UL (ref 0–0.7)
EOSINOPHIL NFR BLD AUTO: 4.5 %
ERYTHROCYTE [DISTWIDTH] IN BLOOD BY AUTOMATED COUNT: 15.3 % (ref 11.5–14.5)
ERYTHROCYTE [SEDIMENTATION RATE] IN BLOOD BY WESTERGREN METHOD: 2 MM/H (ref 0–20)
GLUCOSE SERPL-MCNC: 86 MG/DL (ref 74–99)
HCG UR QL IA.RAPID: NEGATIVE
HCT VFR BLD AUTO: 38.9 % (ref 36–46)
HGB BLD-MCNC: 12.2 G/DL (ref 12–16)
HOLD SPECIMEN: NORMAL
IMM GRANULOCYTES # BLD AUTO: 0.02 X10*3/UL (ref 0–0.7)
IMM GRANULOCYTES NFR BLD AUTO: 0.3 % (ref 0–0.9)
LYMPHOCYTES # BLD AUTO: 2 X10*3/UL (ref 1.2–4.8)
LYMPHOCYTES NFR BLD AUTO: 33.2 %
MCH RBC QN AUTO: 25.5 PG (ref 26–34)
MCHC RBC AUTO-ENTMCNC: 31.4 G/DL (ref 32–36)
MCV RBC AUTO: 81 FL (ref 80–100)
MONOCYTES # BLD AUTO: 0.43 X10*3/UL (ref 0.1–1)
MONOCYTES NFR BLD AUTO: 7.1 %
NEUTROPHILS # BLD AUTO: 3.26 X10*3/UL (ref 1.2–7.7)
NEUTROPHILS NFR BLD AUTO: 54.1 %
NRBC BLD-RTO: 0 /100 WBCS (ref 0–0)
PLATELET # BLD AUTO: 304 X10*3/UL (ref 150–450)
POTASSIUM SERPL-SCNC: 3.5 MMOL/L (ref 3.5–5.3)
RBC # BLD AUTO: 4.78 X10*6/UL (ref 4–5.2)
SODIUM SERPL-SCNC: 138 MMOL/L (ref 136–145)
WBC # BLD AUTO: 6 X10*3/UL (ref 4.4–11.3)

## 2025-04-19 PROCEDURE — 99284 EMERGENCY DEPT VISIT MOD MDM: CPT | Mod: 25 | Performed by: EMERGENCY MEDICINE

## 2025-04-19 PROCEDURE — 36415 COLL VENOUS BLD VENIPUNCTURE: CPT | Performed by: EMERGENCY MEDICINE

## 2025-04-19 PROCEDURE — 85652 RBC SED RATE AUTOMATED: CPT | Performed by: EMERGENCY MEDICINE

## 2025-04-19 PROCEDURE — 96372 THER/PROPH/DIAG INJ SC/IM: CPT | Performed by: EMERGENCY MEDICINE

## 2025-04-19 PROCEDURE — 72125 CT NECK SPINE W/O DYE: CPT | Performed by: STUDENT IN AN ORGANIZED HEALTH CARE EDUCATION/TRAINING PROGRAM

## 2025-04-19 PROCEDURE — 70450 CT HEAD/BRAIN W/O DYE: CPT

## 2025-04-19 PROCEDURE — 81025 URINE PREGNANCY TEST: CPT | Performed by: EMERGENCY MEDICINE

## 2025-04-19 PROCEDURE — 80048 BASIC METABOLIC PNL TOTAL CA: CPT | Performed by: EMERGENCY MEDICINE

## 2025-04-19 PROCEDURE — 72125 CT NECK SPINE W/O DYE: CPT

## 2025-04-19 PROCEDURE — 70450 CT HEAD/BRAIN W/O DYE: CPT | Performed by: STUDENT IN AN ORGANIZED HEALTH CARE EDUCATION/TRAINING PROGRAM

## 2025-04-19 PROCEDURE — 2500000004 HC RX 250 GENERAL PHARMACY W/ HCPCS (ALT 636 FOR OP/ED): Mod: JZ,SE | Performed by: EMERGENCY MEDICINE

## 2025-04-19 PROCEDURE — 85025 COMPLETE CBC W/AUTO DIFF WBC: CPT | Performed by: EMERGENCY MEDICINE

## 2025-04-19 RX ORDER — ORPHENADRINE CITRATE 100 MG/1
100 TABLET, EXTENDED RELEASE ORAL 2 TIMES DAILY PRN
Qty: 20 TABLET | Refills: 0 | Status: SHIPPED | OUTPATIENT
Start: 2025-04-19 | End: 2025-04-29

## 2025-04-19 RX ORDER — KETOROLAC TROMETHAMINE 10 MG/1
10 TABLET, FILM COATED ORAL EVERY 6 HOURS PRN
Qty: 20 TABLET | Refills: 0 | Status: SHIPPED | OUTPATIENT
Start: 2025-04-19 | End: 2025-04-24

## 2025-04-19 RX ORDER — KETOROLAC TROMETHAMINE 30 MG/ML
30 INJECTION, SOLUTION INTRAMUSCULAR; INTRAVENOUS ONCE
Status: COMPLETED | OUTPATIENT
Start: 2025-04-19 | End: 2025-04-19

## 2025-04-19 RX ORDER — ORPHENADRINE CITRATE 30 MG/ML
60 INJECTION INTRAMUSCULAR; INTRAVENOUS ONCE
Status: COMPLETED | OUTPATIENT
Start: 2025-04-19 | End: 2025-04-19

## 2025-04-19 RX ADMIN — KETOROLAC TROMETHAMINE 30 MG: 30 INJECTION, SOLUTION INTRAMUSCULAR at 15:05

## 2025-04-19 RX ADMIN — ORPHENADRINE CITRATE 60 MG: 60 INJECTION INTRAMUSCULAR; INTRAVENOUS at 15:05

## 2025-04-19 ASSESSMENT — PAIN DESCRIPTION - PROGRESSION: CLINICAL_PROGRESSION: GRADUALLY IMPROVING

## 2025-04-19 ASSESSMENT — PAIN - FUNCTIONAL ASSESSMENT
PAIN_FUNCTIONAL_ASSESSMENT: 0-10
PAIN_FUNCTIONAL_ASSESSMENT: 0-10

## 2025-04-19 ASSESSMENT — COLUMBIA-SUICIDE SEVERITY RATING SCALE - C-SSRS
6. HAVE YOU EVER DONE ANYTHING, STARTED TO DO ANYTHING, OR PREPARED TO DO ANYTHING TO END YOUR LIFE?: NO
2. HAVE YOU ACTUALLY HAD ANY THOUGHTS OF KILLING YOURSELF?: NO
1. IN THE PAST MONTH, HAVE YOU WISHED YOU WERE DEAD OR WISHED YOU COULD GO TO SLEEP AND NOT WAKE UP?: NO

## 2025-04-19 ASSESSMENT — PAIN DESCRIPTION - LOCATION
LOCATION: NECK
LOCATION: NECK

## 2025-04-19 ASSESSMENT — PAIN DESCRIPTION - PAIN TYPE
TYPE: ACUTE PAIN
TYPE: ACUTE PAIN

## 2025-04-19 ASSESSMENT — PAIN SCALES - GENERAL
PAINLEVEL_OUTOF10: 8
PAINLEVEL_OUTOF10: 3

## 2025-04-19 NOTE — DISCHARGE INSTRUCTIONS
Norflex and Toradol as prescribed.    Warm compresses help reduce the muscle spasm.    Follow-up with your primary care provider in 3 to 5 days if not improving.    Return for worsening symptoms or concerns.

## 2025-04-19 NOTE — ED PROVIDER NOTES
[facility]   ED  Provider Note  4/19/2025  2:09 PM  AC11/AC11      Chief Complaint   Patient presents with    Neck Pain        History of Present Illness:   Candelaria Robins is a 29 y.o. female presenting to the ED for neck pain, beginning yesterday morning.  The complaint has been persistent, moderate to severe in severity, and worsened by movement.  Patient has tried using warm compresses and cold compresses without success.  She denies runny nose or cough symptoms.  She does have a mild headache.  She denies any overuse or trauma to the head or neck.  She has had no fever or chills.  She does not have any change in vision or history of migraine headaches.      Review of Systems:   Pertinent positives and review of systems as noted above.  Remaining 10 review of systems is negative or noncontributory to today's episode of care.  Review of Systems       --------------------------------------------- PAST HISTORY ---------------------------------------------  Past Medical History: Medical History[1]     Past Surgical History: Surgical History[2]     Social History:   Social History     Social History Narrative    Not on file        Family History: family history is not on file. Unless otherwise noted, family history is non contributory    Patient's Medications   New Prescriptions    No medications on file   Previous Medications    ALBUTEROL (VENTOLIN HFA) 90 MCG/ACTUATION INHALER    INHALE 2 PUFFS BY MOUTH EVERY 6 HOURS AS NEEDED    ALBUTEROL 90 MCG/ACTUATION INHALER    INHALE 1-2 PUFFS EVERY 6 HOURS IF NEEDED FOR WHEEZING.    BUSPIRONE (BUSPAR) 15 MG TABLET    Take by mouth.    CIPROFLOXACIN (CILOXAN) 0.3 % OPHTHALMIC SOLUTION    INSTILL 1 DROP INTO AFFECTED EYE EVERY HOUR FOR 2 DAYS WHEN AWAKE THEN EVERY 4 HOURS FOR 4 MORE DAYS    CYCLOBENZAPRINE (FLEXERIL) 10 MG TABLET    Take 1 tablet (10 mg) by mouth 3 times a day as needed for muscle spasms for up to 10 days.    ESCITALOPRAM (LEXAPRO) 20 MG TABLET    TAKE 1  TABLET BY MOUTH EVERY DAY    FLUTICASONE (FLONASE) 50 MCG/ACTUATION NASAL SPRAY    Administer 1 spray into each nostril once daily. Shake gently. Before first use, prime pump. After use, clean tip and replace cap.    HYDROXYZINE HCL (ATARAX) 25 MG TABLET    TAKE 1 TO 2 TABLETS BY MOUTH EVERY DAY AT BEDTIME    LORATADINE (CLARITIN) 10 MG TABLET    Take 1 tablet (10 mg) by mouth once daily for 20 days.    TRAZODONE (DESYREL) 50 MG TABLET    Take 1 tablet (50 mg) by mouth as needed at bedtime for sleep.    TRAZODONE (DESYREL) 50 MG TABLET    TAKE 1 TABLET BY MOUTH AT BEDTIME AS NEEDED FOR SLEEP    VENTOLIN HFA 90 MCG/ACTUATION INHALER    Inhale 2 puffs every 6 hours if needed.   Modified Medications    No medications on file   Discontinued Medications    No medications on file      The patient’s home medications have been reviewed.    Allergies: Patient has no known allergies.    -------------------------------------------------- RESULTS -------------------------------------------------  All laboratory and radiology results have been personally reviewed by myself   LABS:  Labs Reviewed   CBC WITH AUTO DIFFERENTIAL - Abnormal       Result Value    WBC 6.0      nRBC 0.0      RBC 4.78      Hemoglobin 12.2      Hematocrit 38.9      MCV 81      MCH 25.5 (*)     MCHC 31.4 (*)     RDW 15.3 (*)     Platelets 304      Neutrophils % 54.1      Immature Granulocytes %, Automated 0.3      Lymphocytes % 33.2      Monocytes % 7.1      Eosinophils % 4.5      Basophils % 0.8      Neutrophils Absolute 3.26      Immature Granulocytes Absolute, Automated 0.02      Lymphocytes Absolute 2.00      Monocytes Absolute 0.43      Eosinophils Absolute 0.27      Basophils Absolute 0.05     BASIC METABOLIC PANEL - Abnormal    Glucose 86      Sodium 138      Potassium 3.5      Chloride 108 (*)     Bicarbonate 23      Anion Gap 11      Urea Nitrogen 16      Creatinine 0.67      eGFR >90      Calcium 9.5     HCG, URINE, QUALITATIVE - Normal    HCG,  "Urine NEGATIVE     SEDIMENTATION RATE, AUTOMATED - Normal    Sedimentation Rate 2     GRAY TOP    Extra Tube Hold for add-ons.           RADIOLOGY:  Interpreted by Radiologist.  CT cervical spine wo IV contrast   Final Result   CT HEAD:   1. No acute intracranial abnormality or calvarial fracture.                  CT CERVICAL SPINE:   1. No acute fracture or traumatic malalignment of the cervical spine.   2. Small central disc spur complex at C6-C7 slightly indents the   ventral thecal sac. No significant canal stenosis or foraminal   stenosis in the cervical spine.        MACRO:   None.        Signed by: Eleazar Myers 4/19/2025 3:43 PM   Dictation workstation:   [id number]      CT head wo IV contrast   Final Result   CT HEAD:   1. No acute intracranial abnormality or calvarial fracture.                  CT CERVICAL SPINE:   1. No acute fracture or traumatic malalignment of the cervical spine.   2. Small central disc spur complex at C6-C7 slightly indents the   ventral thecal sac. No significant canal stenosis or foraminal   stenosis in the cervical spine.        MACRO:   None.        Signed by: Eleazar Myers 4/19/2025 3:43 PM   Dictation workstation:   [id number]          No results found for this or any previous visit (from the past 4464 hours).  ------------------------- NURSING NOTES AND VITALS REVIEWED ---------------------------   The nursing notes within the ED encounter and vital signs as below have been reviewed.   /57   Pulse (!) 102   Temp 36.8 °C (98.2 °F)   Resp 19   Ht 1.6 m (5' 3\")   Wt 53.5 kg (118 lb)   SpO2 100%   BMI 20.90 kg/m²   Oxygen Saturation Interpretation: Normal      ---------------------------------------------------PHYSICAL EXAM--------------------------------------  Physical Exam   Constitutional/General: Alert,  well appearing, non toxic in NAD  Head: Normocephalic and atraumatic  Eyes: PERRL, EOMI, conjunctiva normal, sclera non icteric  Mouth: Oropharynx " clear, handling secretions, no trismus, no asymmetry of the posterior oropharynx or uvular edema  Neck: Decreased range of motion tender to palpation in the paraspinal musculature., no stridor, no crepitus, no meningeal signs  Respiratory: Lungs clear to auscultation bilaterally, no wheezes, rales, or rhonchi. Not in respiratory distress  Cardiovascular:  Regular rate. Regular rhythm. No murmurs, gallops, or rubs. 2+ distal pulses  Chest: No chest wall tenderness  GI:  Abdomen Soft, Non tender, Non distended.  +BS. No organomegaly, no palpable masses,  No rebound, guarding, or rigidity.   Musculoskeletal: Moves all extremities x 4. Warm and well perfused, no clubbing, cyanosis, or edema. Capillary refill <3 seconds  Integument: skin warm and dry. No rashes.   Lymphatic: no lymphadenopathy noted  Neurologic: No focal deficits, symmetric strength 5/5 in the upper and lower extremities bilaterally  Psychiatric: Normal Affect    Procedures    ------------------------------ ED COURSE/MEDICAL DECISION MAKING----------------------  Diagnoses as of 04/19/25 1627   Bilateral posterior neck pain      Patient has a muscular neck pain in the ribs movement.  There is no evidence of acute infection or meningitis.  CT scan of the brain shows no evidence of intracranial bleed or mass.  X-ray of cervical spine shows a small C6-C7 cervical spur with minimal indentation of the thecal sac.  The patient is feeling better after treatment with Toradol and Norflex.  She will be discharged home with outpatient follow-up with her primary care provider.      Medical Decision Making:   Discharge to home  Diagnoses as of 04/19/25 1627   Bilateral posterior neck pain      Counseling:   The emergency provider has spoken with the patient and discussed today’s results, in addition to providing specific details for the plan of care and counseling regarding the diagnosis and prognosis.  Questions are answered at this time and they are agreeable with  the plan.      --------------------------------- IMPRESSION AND DISPOSITION ---------------------------------        IMPRESSION  1. Bilateral posterior neck pain        DISPOSITION  Disposition: Discharge to home  Patient condition is fair      Billing Provider Critical Care Time: 0 minutes         [1] History reviewed. No pertinent past medical history.  [2] History reviewed. No pertinent surgical history.       Dutch Alejandra MD  04/19/25 4081     Medical Assessment Completed on: 27-Oct-2023 19:30

## 2025-05-13 RX ORDER — SERTRALINE HYDROCHLORIDE 50 MG/1
1 TABLET, FILM COATED ORAL
COMMUNITY
Start: 2025-03-06

## 2025-05-13 RX ORDER — PROPRANOLOL HYDROCHLORIDE 10 MG/1
1 TABLET ORAL 2 TIMES DAILY PRN
COMMUNITY
Start: 2025-01-12

## 2025-05-13 NOTE — PROGRESS NOTES
"Subjective   Patient ID: Candelaria Robins is a 29 y.o. female who presents for No chief complaint on file..  HPI  Current Medications[1]  She wants a service dog. She is still in counseling. She wants one for her anxiety/depression. She cannot have a \"regular\" dog at her property. She moved out of her sisters house. She is still having heavy chest, SOB at times. Happens every morning. Trying to stay consistent with her medications.      Depression/Anxiety: She has not been taking her medications, she would like to take them regularly. Sometimes they were not helping like they should have. She is taking her trazodone since the last couple of days. She had stopped taking her meds for about 2 months.   She has anxiety attacks and states it might be making her asthma worse. She is on Escitalopram, hydroxyzine, trazodone and buspirone. Her panic attacks last a while. She gets nauseated, cramping, difficulty breathing. She will go outside. She takes ginger for her.   She sees Community counseling. She has a history of cutting. She is trying to see them weekly.   She had seen Tika with [facility], does not think she was seeing [facility].   She does see community counseling, thinks she might have missed them.   She would like to see psychiatrist. Her panic attacks are happening 2-3 times minimum to 3-4 max daily. She states that everything hurts. Some days they get better and go away.      Chest pains: She still has multiple panic attacks at times. She is having heaviness 7/10 and hard to breath.      She is also having chest pains. They are a little different than when she has a panic attack, it would hurt to breathe. She has not felt this recently, did not feel the same.      She is drinking about 1 energy drink per day, she has an occasional dunking doughnuts. She drinks maybe once per week. She does drink pop regular.      Asthma: She uses a Ventolin inhaler as needed.      All other systems reviewed and " negative for complaint unless stated above.  Review of Systems    There were no vitals taken for this visit.     [unfilled]    Objective         Problem List Items Addressed This Visit    None    Physical Exam        Gen: No acute distress, alert and oriented x3, pleasant   HEENT: moist mucous membranes, b/l external auditory canals are clear of debris, TMs within normal limits, no oropharyngeal lesions, eomi, perrla   Neck: thyroid within normal limits, no lymphadenopathy   CV: RRR, normal S1/S2, no murmur   Resp: Clear to auscultation bilaterally, no wheezes or rhonchi appreciated  Abd: soft, nontender, non-distended, no guarding/rigidity, bowel sounds present  Extr: no edema, no calf tenderness  Derm: Skin is warm and dry, no rashes appreciated  Psych: mood is good, affect is congruent, good hygiene, normal speech and eye contact  Neuro: cranial nerves grossly intact, normal gait      Assessment/Plan   {Assess/PlanSmartLinks:64014}  #Asthma   Continue to use inhaler as needed      #Anxiety and depression  Following with community counseling  Has an appointment with psych 10/24  Letter provided stating she has anxiety and would benefit from a  dog   continue escitalopram, hydroxyzine, trazadone and buspirone   may discuss possible changing medications d/t anxiety attacks   possible effexor   Encouraged to continue meds   Cut back on caffeine         #Chest pain   If still having would consider stress test EKG   Zio patch ordered      Likely related to anxiety     ECG 10/4/24  CT cervical 4/19/25       [1]   Current Outpatient Medications:     propranolol (Inderal) 10 mg tablet, Take 1 tablet (10 mg) by mouth 2 times a day as needed for anxiety., Disp: , Rfl:     sertraline (Zoloft) 50 mg tablet, Take 1 tablet (50 mg) by mouth early in the morning.., Disp: , Rfl:     albuterol (Ventolin HFA) 90 mcg/actuation inhaler, INHALE 2 PUFFS BY MOUTH EVERY 6 HOURS AS NEEDED, Disp: 18 g, Rfl: 2    albuterol 90  mcg/actuation inhaler, INHALE 1-2 PUFFS EVERY 6 HOURS IF NEEDED FOR WHEEZING., Disp: 18 g, Rfl: 1    busPIRone (Buspar) 15 mg tablet, Take by mouth., Disp: , Rfl:     ciprofloxacin (Ciloxan) 0.3 % ophthalmic solution, INSTILL 1 DROP INTO AFFECTED EYE EVERY HOUR FOR 2 DAYS WHEN AWAKE THEN EVERY 4 HOURS FOR 4 MORE DAYS, Disp: , Rfl:     cyclobenzaprine (Flexeril) 10 mg tablet, Take 1 tablet (10 mg) by mouth 3 times a day as needed for muscle spasms for up to 10 days., Disp: 15 tablet, Rfl: 0    escitalopram (Lexapro) 20 mg tablet, TAKE 1 TABLET BY MOUTH EVERY DAY, Disp: 90 tablet, Rfl: 1    fluticasone (Flonase) 50 mcg/actuation nasal spray, Administer 1 spray into each nostril once daily. Shake gently. Before first use, prime pump. After use, clean tip and replace cap., Disp: 16 g, Rfl: 0    hydrOXYzine HCL (Atarax) 25 mg tablet, TAKE 1 TO 2 TABLETS BY MOUTH EVERY DAY AT BEDTIME, Disp: 90 tablet, Rfl: 2    loratadine (Claritin) 10 mg tablet, Take 1 tablet (10 mg) by mouth once daily for 20 days., Disp: 20 tablet, Rfl: 0    orphenadrine (Norflex) 100 mg 12 hr tablet, Take 1 tablet (100 mg) by mouth 2 times a day as needed for muscle spasms for up to 10 days. Do not crush, chew, or split., Disp: 20 tablet, Rfl: 0    traZODone (Desyrel) 50 mg tablet, Take 1 tablet (50 mg) by mouth as needed at bedtime for sleep., Disp: , Rfl:     traZODone (Desyrel) 50 mg tablet, TAKE 1 TABLET BY MOUTH AT BEDTIME AS NEEDED FOR SLEEP, Disp: 90 tablet, Rfl: 1    Ventolin HFA 90 mcg/actuation inhaler, Inhale 2 puffs every 6 hours if needed., Disp: , Rfl:

## 2025-06-02 ENCOUNTER — APPOINTMENT (OUTPATIENT)
Dept: PRIMARY CARE | Facility: CLINIC | Age: 30
End: 2025-06-02
Payer: COMMERCIAL

## 2025-08-22 DIAGNOSIS — F41.0 PANIC ATTACKS: ICD-10-CM

## 2025-08-22 DIAGNOSIS — F32.A DEPRESSION, UNSPECIFIED: ICD-10-CM

## 2025-08-22 DIAGNOSIS — J45.909 UNSPECIFIED ASTHMA, UNCOMPLICATED (HHS-HCC): ICD-10-CM

## 2025-08-22 DIAGNOSIS — F41.9 ANXIETY: ICD-10-CM

## 2025-08-22 DIAGNOSIS — F41.9 ANXIETY DISORDER, UNSPECIFIED: ICD-10-CM

## 2025-08-22 DIAGNOSIS — R07.9 CHEST PAIN, UNSPECIFIED TYPE: ICD-10-CM

## 2025-08-22 RX ORDER — BUSPIRONE HYDROCHLORIDE 15 MG/1
TABLET ORAL
Qty: 30 TABLET | Refills: 0 | Status: CANCELLED | OUTPATIENT
Start: 2025-08-22

## 2025-08-25 RX ORDER — SERTRALINE HYDROCHLORIDE 50 MG/1
50 TABLET, FILM COATED ORAL
Qty: 30 TABLET | Refills: 0 | Status: SHIPPED | OUTPATIENT
Start: 2025-08-25

## 2025-08-25 RX ORDER — ALBUTEROL SULFATE 90 UG/1
INHALANT RESPIRATORY (INHALATION)
Qty: 18 G | Refills: 0 | Status: SHIPPED | OUTPATIENT
Start: 2025-08-25

## 2025-08-25 RX ORDER — PROPRANOLOL HYDROCHLORIDE 10 MG/1
TABLET ORAL
Qty: 30 TABLET | Refills: 0 | Status: SHIPPED | OUTPATIENT
Start: 2025-08-25

## 2025-08-25 RX ORDER — TRAZODONE HYDROCHLORIDE 50 MG/1
50 TABLET ORAL NIGHTLY PRN
Qty: 90 TABLET | Refills: 0 | Status: SHIPPED | OUTPATIENT
Start: 2025-08-25

## 2025-08-25 RX ORDER — HYDROXYZINE HYDROCHLORIDE 25 MG/1
TABLET, FILM COATED ORAL
Qty: 30 TABLET | Refills: 0 | Status: SHIPPED | OUTPATIENT
Start: 2025-08-25

## 2025-08-25 RX ORDER — ESCITALOPRAM OXALATE 20 MG/1
20 TABLET ORAL DAILY
Qty: 30 TABLET | Refills: 0 | Status: SHIPPED | OUTPATIENT
Start: 2025-08-25